# Patient Record
Sex: MALE | Race: WHITE | NOT HISPANIC OR LATINO | Employment: OTHER | URBAN - METROPOLITAN AREA
[De-identification: names, ages, dates, MRNs, and addresses within clinical notes are randomized per-mention and may not be internally consistent; named-entity substitution may affect disease eponyms.]

---

## 2016-12-16 LAB — HCV AB SER-ACNC: NEGATIVE

## 2023-02-14 LAB
LEFT EYE DIABETIC RETINOPATHY: NORMAL
RIGHT EYE DIABETIC RETINOPATHY: NORMAL
SEVERITY (EYE EXAM): NORMAL

## 2023-05-01 ENCOUNTER — OFFICE VISIT (OUTPATIENT)
Dept: FAMILY MEDICINE CLINIC | Facility: CLINIC | Age: 78
End: 2023-05-01

## 2023-05-01 VITALS
DIASTOLIC BLOOD PRESSURE: 82 MMHG | SYSTOLIC BLOOD PRESSURE: 146 MMHG | HEIGHT: 69 IN | TEMPERATURE: 97.3 F | WEIGHT: 169.8 LBS | BODY MASS INDEX: 25.15 KG/M2 | OXYGEN SATURATION: 98 % | HEART RATE: 65 BPM

## 2023-05-01 DIAGNOSIS — N18.31 TYPE 2 DIABETES MELLITUS WITH STAGE 3A CHRONIC KIDNEY DISEASE, WITHOUT LONG-TERM CURRENT USE OF INSULIN (HCC): ICD-10-CM

## 2023-05-01 DIAGNOSIS — E11.22 TYPE 2 DIABETES MELLITUS WITH STAGE 3A CHRONIC KIDNEY DISEASE, WITHOUT LONG-TERM CURRENT USE OF INSULIN (HCC): ICD-10-CM

## 2023-05-01 DIAGNOSIS — Z12.11 SCREENING FOR COLON CANCER: ICD-10-CM

## 2023-05-01 DIAGNOSIS — I10 ESSENTIAL HYPERTENSION: Primary | ICD-10-CM

## 2023-05-01 DIAGNOSIS — R39.12 BENIGN PROSTATIC HYPERPLASIA WITH WEAK URINARY STREAM: ICD-10-CM

## 2023-05-01 DIAGNOSIS — N40.1 BENIGN PROSTATIC HYPERPLASIA WITH WEAK URINARY STREAM: ICD-10-CM

## 2023-05-01 DIAGNOSIS — E78.2 MIXED HYPERLIPIDEMIA: ICD-10-CM

## 2023-05-01 DIAGNOSIS — N18.31 STAGE 3A CHRONIC KIDNEY DISEASE (HCC): ICD-10-CM

## 2023-05-01 PROBLEM — N40.0 BPH (BENIGN PROSTATIC HYPERPLASIA): Status: ACTIVE | Noted: 2021-11-14

## 2023-05-01 PROBLEM — N18.30 CKD (CHRONIC KIDNEY DISEASE), STAGE III (HCC): Status: ACTIVE | Noted: 2021-11-14

## 2023-05-01 RX ORDER — LISINOPRIL 10 MG/1
10 TABLET ORAL DAILY
Qty: 90 TABLET | Refills: 3 | Status: SHIPPED | OUTPATIENT
Start: 2023-05-01

## 2023-05-01 RX ORDER — GLIPIZIDE 5 MG/1
TABLET, FILM COATED, EXTENDED RELEASE ORAL
COMMUNITY
Start: 2023-03-19 | End: 2023-05-01 | Stop reason: SDUPTHER

## 2023-05-01 RX ORDER — HYDROCHLOROTHIAZIDE 25 MG/1
25 TABLET ORAL DAILY
Qty: 90 TABLET | Refills: 5 | Status: SHIPPED | OUTPATIENT
Start: 2023-05-01

## 2023-05-01 RX ORDER — LANCETS 30 GAUGE
EACH MISCELLANEOUS DAILY
COMMUNITY
Start: 2022-12-27

## 2023-05-01 RX ORDER — GLIPIZIDE 5 MG/1
5 TABLET, FILM COATED, EXTENDED RELEASE ORAL DAILY
Qty: 90 TABLET | Refills: 3 | Status: SHIPPED | OUTPATIENT
Start: 2023-05-01

## 2023-05-01 RX ORDER — TAMSULOSIN HYDROCHLORIDE 0.4 MG/1
CAPSULE ORAL
COMMUNITY
Start: 2023-04-07 | End: 2023-05-01 | Stop reason: SDUPTHER

## 2023-05-01 RX ORDER — TAMSULOSIN HYDROCHLORIDE 0.4 MG/1
0.4 CAPSULE ORAL
Qty: 90 CAPSULE | Refills: 3 | Status: SHIPPED | OUTPATIENT
Start: 2023-05-01

## 2023-05-01 RX ORDER — SIMVASTATIN 20 MG
TABLET ORAL
COMMUNITY
Start: 2023-04-23 | End: 2023-05-01 | Stop reason: SDUPTHER

## 2023-05-01 RX ORDER — SIMVASTATIN 20 MG
20 TABLET ORAL DAILY
Qty: 90 TABLET | Refills: 3 | Status: SHIPPED | OUTPATIENT
Start: 2023-05-01

## 2023-05-01 RX ORDER — CANAGLIFLOZIN 100 MG/1
TABLET, FILM COATED ORAL
COMMUNITY
Start: 2023-04-30 | End: 2023-05-01

## 2023-05-01 NOTE — PATIENT INSTRUCTIONS
History is reviewed  Blood pressure is very high  Keeping in mind his history of being admitted with high potassium of 5 8 in 11/21, will restart lisinopril 10 mg daily along with 25 mg of hydrochlorothiazide  Recheck potassium in about 3 weeks  He knows to avoid foods that are high in potassium content such as bananas and orange juice  Important to bring blood pressure down as his protein in the urine has elevated  Lipid profile excellent  Continue simvastatin  A1c 7 3  Increase Ozempic to 1 mg weekly and stop Invokana for now  Continue glipizide at 5 mg daily  Tamsulosin works well for urine stream   He was asked to obtain blood work in 3 weeks and follow-up for blood pressure in 6 weeks  Had COVID booster 2 weeks ago  Had Zostavax but not Shingrix  Shingrix is recommended and is 2 shots obtained in local drugstore  Will do Colbindu for colon cancer screening

## 2023-05-01 NOTE — PROGRESS NOTES
Chief Complaint   Patient presents with    [facility]        HPI   76-year-old male presents as a new patient  Past medical history significant for diabetes, hypertension, and chronic kidney disease  Remote history of a kidney stone which was made of uric acid  History negative for MI, stroke, and cancer  Diabetes present for about 20 years  Last A1c done 2 weeks ago 7 3  Excellent lipid profile  Medications are noted below  Has been on tamsulosin for obstructive symptoms and urine stream improved  No allergies to medications  Non-smoker  No alcohol  Notes that on Ozempic, has lost over 20 pounds  Past Medical History:   Diagnosis Date    CKD (chronic kidney disease) 2021    Diabetes type 2, controlled ([facility] [address] ) 2002    [facility]    did have this [facility]        History reviewed  No pertinent surgical history  Social History     Tobacco Use    Smoking status: Never    Smokeless tobacco: Never   Substance Use Topics    Alcohol use: Not Currently       Social History     Social History Narrative     since 1968  2 children  7 grandchildren  3 live nearby, 4 live in [place]  Retired  Was in [employer]  Then a   Wife also retired  Enjoys walking, visiting friends and grandkids  25-30 miles a week  The following portions of the patient's history were reviewed and updated as appropriate: allergies, current medications, past family history, past medical history, past social history, past surgical history and problem list       Review of Systems   Constitutional: Negative for activity change and appetite change  HENT: Negative for ear pain and hearing loss  Eyes: Negative for visual disturbance  Respiratory: Negative for shortness of breath and wheezing  Cardiovascular: Negative for chest pain and leg swelling  Gastrointestinal: Negative for abdominal pain, constipation and diarrhea     Genitourinary: Negative for "difficulty urinating  Musculoskeletal: Negative for arthralgias and back pain  Skin: Negative for rash  Neurological: Negative for headaches  Psychiatric/Behavioral: Negative for dysphoric mood  The patient is not nervous/anxious  /82 (BP Location: Left arm, Patient Position: Sitting, Cuff Size: Standard)   Pulse 65   Temp (!) 97 3 °F (36 3 °C) (Temporal)   Ht 5' 8 75\" (1 746 m) Comment: with shoes on  Wt 77 kg (169 lb 12 8 oz)   SpO2 98%   BMI 25 26 kg/m²      Physical Exam   Healthy-appearing male in no acute distress  Repeat blood pressure 180/80 in each arm  Lungs are clear  Heart regular with no murmur  Abdomen nontender  No edema  Mood is upbeat  Affect appropriate  Current Outpatient Medications:     ASPIRIN 81 PO, Take 1 tablet by mouth, Disp: , Rfl:     glucose blood test strip, Use 1 each daily as needed Use as instructed-One Touch Delica test strips, Disp: , Rfl:     OneTouch Delica Lancets 61F MISC, daily, Disp: , Rfl:     glipiZIDE (GLUCOTROL XL) 5 mg 24 hr tablet, Take 1 tablet (5 mg total) by mouth daily, Disp: 90 tablet, Rfl: 3    hydrochlorothiazide (HYDRODIURIL) 25 mg tablet, Take 1 tablet (25 mg total) by mouth daily, Disp: 90 tablet, Rfl: 5    lisinopril (ZESTRIL) 10 mg tablet, Take 1 tablet (10 mg total) by mouth daily, Disp: 90 tablet, Rfl: 3    semaglutide, 1 mg/dose, (Ozempic) 4 mg/3 mL injection pen, Inject 0 75 mL (1 mg total) under the skin once a week, Disp: 3 mL, Rfl: 5    simvastatin (ZOCOR) 20 mg tablet, Take 1 tablet (20 mg total) by mouth in the morning, Disp: 90 tablet, Rfl: 3    tamsulosin (FLOMAX) 0 4 mg, Take 1 capsule (0 4 mg total) by mouth daily with dinner, Disp: 90 capsule, Rfl: 3     No problem-specific Assessment & Plan notes found for this encounter  Diagnoses and all orders for this visit:    Essential hypertension  -     lisinopril (ZESTRIL) 10 mg tablet;  Take 1 tablet (10 mg total) by mouth daily  -     " hydrochlorothiazide (HYDRODIURIL) 25 mg tablet; Take 1 tablet (25 mg total) by mouth daily    Stage 3a chronic kidney disease (HCC)    Mixed hyperlipidemia  -     simvastatin (ZOCOR) 20 mg tablet; Take 1 tablet (20 mg total) by mouth in the morning    Type 2 diabetes mellitus with stage 3a chronic kidney disease, without long-term current use of insulin (HCC)  -     glipiZIDE (GLUCOTROL XL) 5 mg 24 hr tablet; Take 1 tablet (5 mg total) by mouth daily  -     semaglutide, 1 mg/dose, (Ozempic) 4 mg/3 mL injection pen; Inject 0 75 mL (1 mg total) under the skin once a week    Screening for colon cancer  -     Cologuard    Benign prostatic hyperplasia with weak urinary stream  -     tamsulosin (FLOMAX) 0 4 mg; Take 1 capsule (0 4 mg total) by mouth daily with dinner    Other orders  -     ASPIRIN 81 PO; Take 1 tablet by mouth  -     Discontinue: Invokana 100 MG  -     Discontinue: glipiZIDE (GLUCOTROL XL) 5 mg 24 hr tablet  -     Discontinue: semaglutide, 0 25 or 0 5 mg/dose, (Ozempic) 2 mg/1 5 mL injection pen; INJECT 0 5MG SUBCUTANEOUSLY ONCE WEEKLY  -     Discontinue: simvastatin (ZOCOR) 20 mg tablet  -     Discontinue: tamsulosin (FLOMAX) 0 4 mg  -     OneTouch Delica Lancets 45B MISC; daily  -     glucose blood test strip; Use 1 each daily as needed Use as instructed-One Touch Delica test strips        Patient Instructions   History is reviewed  Blood pressure is very high  Keeping in mind his history of being admitted with high potassium of 5 8 in 11/21, will restart lisinopril 10 mg daily along with 25 mg of hydrochlorothiazide  Recheck potassium in about 3 weeks  He knows to avoid foods that are high in potassium content such as bananas and orange juice  Important to bring blood pressure down as his protein in the urine has elevated  Lipid profile excellent  Continue simvastatin  A1c 7 3  Increase Ozempic to 1 mg weekly and stop Invokana for now  Continue glipizide at 5 mg daily    Tamsulosin works well for urine stream   He was asked to obtain blood work in 3 weeks and follow-up for blood pressure in 6 weeks  Had COVID booster 2 weeks ago  Had Zostavax but not Shingrix  Shingrix is recommended and is 2 shots obtained in local drugstore  Will do Cologuard for colon cancer screening

## 2023-05-23 ENCOUNTER — LAB (OUTPATIENT)
Dept: LAB | Facility: MEDICAL CENTER | Age: 78
End: 2023-05-23

## 2023-05-23 DIAGNOSIS — I10 ESSENTIAL HYPERTENSION: ICD-10-CM

## 2023-05-23 LAB
ANION GAP SERPL CALCULATED.3IONS-SCNC: 2 MMOL/L (ref 4–13)
BUN SERPL-MCNC: 24 MG/DL (ref 5–25)
CALCIUM SERPL-MCNC: 9.2 MG/DL (ref 8.3–10.1)
CHLORIDE SERPL-SCNC: 108 MMOL/L (ref 96–108)
CO2 SERPL-SCNC: 25 MMOL/L (ref 21–32)
CREAT SERPL-MCNC: 1.58 MG/DL (ref 0.6–1.3)
GFR SERPL CREATININE-BSD FRML MDRD: 41 ML/MIN/1.73SQ M
GLUCOSE P FAST SERPL-MCNC: 162 MG/DL (ref 65–99)
POTASSIUM SERPL-SCNC: 4.4 MMOL/L (ref 3.5–5.3)
SODIUM SERPL-SCNC: 135 MMOL/L (ref 135–147)

## 2023-05-27 LAB — COLOGUARD RESULT REPORTABLE: NEGATIVE

## 2023-06-12 ENCOUNTER — TELEPHONE (OUTPATIENT)
Dept: ADMINISTRATIVE | Facility: OTHER | Age: 78
End: 2023-06-12

## 2023-06-12 ENCOUNTER — OFFICE VISIT (OUTPATIENT)
Dept: FAMILY MEDICINE CLINIC | Facility: CLINIC | Age: 78
End: 2023-06-12
Payer: MEDICARE

## 2023-06-12 VITALS
OXYGEN SATURATION: 99 % | BODY MASS INDEX: 24.59 KG/M2 | DIASTOLIC BLOOD PRESSURE: 78 MMHG | HEIGHT: 69 IN | WEIGHT: 166 LBS | TEMPERATURE: 97.1 F | HEART RATE: 75 BPM | SYSTOLIC BLOOD PRESSURE: 136 MMHG

## 2023-06-12 DIAGNOSIS — E78.2 MIXED HYPERLIPIDEMIA: ICD-10-CM

## 2023-06-12 DIAGNOSIS — N18.31 TYPE 2 DIABETES MELLITUS WITH STAGE 3A CHRONIC KIDNEY DISEASE, WITHOUT LONG-TERM CURRENT USE OF INSULIN (HCC): ICD-10-CM

## 2023-06-12 DIAGNOSIS — E11.22 TYPE 2 DIABETES MELLITUS WITH STAGE 3A CHRONIC KIDNEY DISEASE, WITHOUT LONG-TERM CURRENT USE OF INSULIN (HCC): ICD-10-CM

## 2023-06-12 DIAGNOSIS — N18.31 STAGE 3A CHRONIC KIDNEY DISEASE (HCC): Primary | ICD-10-CM

## 2023-06-12 DIAGNOSIS — I10 ESSENTIAL HYPERTENSION: ICD-10-CM

## 2023-06-12 PROCEDURE — 99214 OFFICE O/P EST MOD 30 MIN: CPT | Performed by: FAMILY MEDICINE

## 2023-06-12 RX ORDER — LISINOPRIL 10 MG/1
5 TABLET ORAL DAILY
Qty: 90 TABLET | Refills: 3
Start: 2023-06-12

## 2023-06-12 NOTE — PROGRESS NOTES
"Chief Complaint   Patient presents with   • Follow-up     6 week f/u-foot exam due        HPI   Here for follow-up of hypertension, diabetes, and chronic kidney disease  At last visit, restarted on lisinopril 10 mg daily along with hydrochlorothiazide 25 mg daily for blood pressure  After about 1 week, he was getting lightheaded so he stopped it  Then he tried it again and lightheadedness resumed  He stopped it again  Blood pressure readings at home are in the 956 systolic  Ozempic was increased to 1 mg daily  Invokana was stopped  Continues on glipizide  No hypoglycemic episodes  Recent GFR was 41  Electrolytes okay  Cologuard was negative  Notes occasional numbness in his right hand, worse when he wipes his rear-end  Past Medical History:   Diagnosis Date   • CKD (chronic kidney disease) 2021   • Diabetes type 2, controlled ([facility] [address] ) 2002   • Hypertension 1984   • Kidney stone 1980    did have this removed        History reviewed  No pertinent surgical history  Social History     Tobacco Use   • Smoking status: Never   • Smokeless tobacco: Never   Substance Use Topics   • Alcohol use: Not Currently       Social History     Social History Narrative     since 1968  2 children  7 grandchildren  3 live nearby, 4 live in [address] [place] [address] [place]  Retired  Was in [employer]  Then a   Wife also retired  Enjoys walking, visiting friends and grandkids  25-30 miles a week           The following portions of the patient's history were reviewed and updated as appropriate: allergies, current medications, past family history, past medical history, past social history, past surgical history and problem list       Review of Systems       /78   Pulse 75   Temp (!) 97 1 °F (36 2 °C) (Temporal)   Ht 5' 8 75\" (1 746 m)   Wt 75 3 kg (166 lb)   SpO2 99%   BMI 24 69 kg/m²      Physical Exam  Cardiovascular:      Pulses: no weak pulses          Dorsalis pedis pulses are 1+ on the right side and " 1+ on the left side  Feet:      Right foot:      Skin integrity: No ulcer, skin breakdown, erythema, warmth, callus or dry skin  Left foot:      Skin integrity: No ulcer, skin breakdown, erythema, warmth, callus or dry skin  As well  Lungs are clear  Heart regular  Diabetic Foot Exam    Patient's shoes and socks removed  Right Foot/Ankle   Right Foot Inspection  Skin Exam: skin normal and skin intact  No dry skin, no warmth, no callus, no erythema, no maceration, no abnormal color, no pre-ulcer, no ulcer and no callus  Sensory   Monofilament testing: intact    Vascular  The right DP pulse is 1+  Left Foot/Ankle  Left Foot Inspection  Skin Exam: skin normal and skin intact  No dry skin, no warmth, no erythema, no maceration, normal color, no pre-ulcer, no ulcer and no callus  Sensory   Monofilament testing: intact    Vascular  The left DP pulse is 1+       Assign Risk Category  No deformity present  No loss of protective sensation  No weak pulses  Risk: 0              Current Outpatient Medications:   •  ASPIRIN 81 PO, Take 1 tablet by mouth, Disp: , Rfl:   •  glipiZIDE (GLUCOTROL XL) 5 mg 24 hr tablet, Take 1 tablet (5 mg total) by mouth daily, Disp: 90 tablet, Rfl: 3  •  glucose blood test strip, Use 1 each daily as needed Use as instructed-One Touch Delica test strips, Disp: , Rfl:   •  lisinopril (ZESTRIL) 10 mg tablet, Take 0 5 tablets (5 mg total) by mouth daily, Disp: 90 tablet, Rfl: 3  •  OneTouch Delica Lancets 13V MISC, daily, Disp: , Rfl:   •  semaglutide, 1 mg/dose, (Ozempic) 4 mg/3 mL injection pen, Inject 0 75 mL (1 mg total) under the skin once a week, Disp: 3 mL, Rfl: 5  •  simvastatin (ZOCOR) 20 mg tablet, Take 1 tablet (20 mg total) by mouth in the morning, Disp: 90 tablet, Rfl: 3  •  tamsulosin (FLOMAX) 0 4 mg, Take 1 capsule (0 4 mg total) by mouth daily with dinner, Disp: 90 capsule, Rfl: 3     No problem-specific Assessment & Plan notes found for this encounter  Diagnoses and all orders for this visit:    Stage 3a chronic kidney disease ([address] )    Essential hypertension  -     lisinopril (ZESTRIL) 10 mg tablet; Take 0 5 tablets (5 mg total) by mouth daily    Type 2 diabetes mellitus with stage 3a chronic kidney disease, without long-term current use of insulin ([facility] [address] )    Mixed hyperlipidemia        Patient Instructions   Blood pressure is significantly improved  Suggest using one half of the 10 mg lisinopril for kidney protection  Review of his kidney function which is basically stable  Last A1c was 7 3  Suspect numbness in the right hand has to do with a pinched nerve in the neck  Symptoms can come and go  Okay to use Tylenol for discomfort in the wrist   May take 2 extra strength Tylenol 3 times a day  Recheck in 4 months for follow-up of blood pressure and A1c

## 2023-06-12 NOTE — PATIENT INSTRUCTIONS
Blood pressure is significantly improved  Suggest using one half of the 10 mg lisinopril for kidney protection  Review of his kidney function which is basically stable  Last A1c was 7 3  Suspect numbness in the right hand has to do with a pinched nerve in the neck  Symptoms can come and go  Okay to use Tylenol for discomfort in the wrist   May take 2 extra strength Tylenol 3 times a day  Recheck in 4 months for follow-up of blood pressure and A1c

## 2023-06-12 NOTE — LETTER
Diabetic Eye Exam Form    Date Requested: 23  Patient: Polina Vega  Patient : 1945   Referring Provider: Houston Vigil MD      DIABETIC Eye Exam Date _______________________________      Type of Exam MUST be documented for Diabetic Eye Exams  Please CHECK ONE  Retinal Exam       Dilated Retinal Exam       OCT       Optomap-Iris Exam      Fundus Photography       Left Eye - Please check Retinopathy or No Retinopathy        Exam did show retinopathy    Exam did not show retinopathy       Right Eye - Please check Retinopathy or No Retinopathy       Exam did show retinopathy    Exam did not show retinopathy       Comments __________________________________________________________    Practice Providing Exam ______________________________________________    Exam Performed By (print name) _______________________________________      Provider Signature ___________________________________________________      These reports are needed for  compliance  Please fax this completed form and a copy of the Diabetic Eye Exam report to our office located at [place] [address] as soon as possible via Fax [phone] attention Tarri Deacon: Phone [phone]  We thank you for your assistance in treating our mutual patient

## 2023-06-12 NOTE — TELEPHONE ENCOUNTER
----- Message from Lavon Burkitt, MD sent at 6/12/2023 11:01 AM EDT -----  Please obtain results of recent eye exam from Dr Radha Martin

## 2023-06-12 NOTE — LETTER
Diabetic Eye Exam Form    Date Requested: 23  Patient: Ian Gant  Patient : 1945   Referring Provider: Silvia Shaikh MD      DIABETIC Eye Exam Date _______________________________      Type of Exam MUST be documented for Diabetic Eye Exams  Please CHECK ONE  Retinal Exam       Dilated Retinal Exam       OCT       Optomap-Iris Exam      Fundus Photography       Left Eye - Please check Retinopathy or No Retinopathy        Exam did show retinopathy    Exam did not show retinopathy       Right Eye - Please check Retinopathy or No Retinopathy       Exam did show retinopathy    Exam did not show retinopathy       Comments __________________________________________________________    Practice Providing Exam ______________________________________________    Exam Performed By (print name) _______________________________________      Provider Signature ___________________________________________________      These reports are needed for  compliance  Please fax this completed form and a copy of the Diabetic Eye Exam report to our office located at [place] [address] as soon as possible via Fax [phone] virgilio Martin: Phone [phone]  We thank you for your assistance in treating our mutual patient

## 2023-06-12 NOTE — TELEPHONE ENCOUNTER
Upon review of the In Basket request and the patient's chart, initial outreach has been made via fax to facility  Please see Contacts section for details        x1 eye    Thank you  Vivian Aguayo

## 2023-06-14 ENCOUNTER — TELEPHONE (OUTPATIENT)
Dept: FAMILY MEDICINE CLINIC | Facility: CLINIC | Age: 78
End: 2023-06-14

## 2023-06-14 NOTE — TELEPHONE ENCOUNTER
Patients wife called she states that patient took his blood pressure today and it was 107/55 and she thinks this is too low and she is asking if he could stop taking the lisinopril he takes 5 mg of it daily please advise

## 2023-06-16 NOTE — TELEPHONE ENCOUNTER
Upon review of the In Basket request we were able to locate, review, and update the patient chart as requested for Diabetic Eye Exam     Any additional questions or concerns should be emailed to the Practice Liaisons via the appropriate education email address, please do not reply via In Basket      Thank you  Rachael Shelton

## 2023-07-14 ENCOUNTER — OFFICE VISIT (OUTPATIENT)
Dept: FAMILY MEDICINE CLINIC | Facility: CLINIC | Age: 78
End: 2023-07-14
Payer: MEDICARE

## 2023-07-14 VITALS
WEIGHT: 162.3 LBS | DIASTOLIC BLOOD PRESSURE: 74 MMHG | HEIGHT: 69 IN | OXYGEN SATURATION: 99 % | SYSTOLIC BLOOD PRESSURE: 126 MMHG | BODY MASS INDEX: 24.04 KG/M2 | HEART RATE: 66 BPM | TEMPERATURE: 97.5 F

## 2023-07-14 DIAGNOSIS — N18.31 STAGE 3A CHRONIC KIDNEY DISEASE (HCC): ICD-10-CM

## 2023-07-14 DIAGNOSIS — E11.22 TYPE 2 DIABETES MELLITUS WITH STAGE 3A CHRONIC KIDNEY DISEASE, WITHOUT LONG-TERM CURRENT USE OF INSULIN (HCC): Primary | ICD-10-CM

## 2023-07-14 DIAGNOSIS — Z01.818 PRE-OP EXAMINATION: ICD-10-CM

## 2023-07-14 DIAGNOSIS — G56.03 BILATERAL CARPAL TUNNEL SYNDROME: ICD-10-CM

## 2023-07-14 DIAGNOSIS — N18.31 TYPE 2 DIABETES MELLITUS WITH STAGE 3A CHRONIC KIDNEY DISEASE, WITHOUT LONG-TERM CURRENT USE OF INSULIN (HCC): Primary | ICD-10-CM

## 2023-07-14 DIAGNOSIS — M35.3 PMR (POLYMYALGIA RHEUMATICA) (HCC): ICD-10-CM

## 2023-07-14 PROBLEM — R76.8 RHEUMATOID FACTOR POSITIVE: Status: ACTIVE | Noted: 2023-07-14

## 2023-07-14 PROBLEM — M06.9 RHEUMATOID ARTHRITIS INVOLVING MULTIPLE SITES (HCC): Status: RESOLVED | Noted: 2023-07-14 | Resolved: 2023-07-14

## 2023-07-14 PROBLEM — M06.9 RHEUMATOID ARTHRITIS INVOLVING MULTIPLE SITES (HCC): Status: ACTIVE | Noted: 2023-07-14

## 2023-07-14 LAB — SL AMB POCT HEMOGLOBIN AIC: 8.2 (ref ?–6.5)

## 2023-07-14 PROCEDURE — 99214 OFFICE O/P EST MOD 30 MIN: CPT | Performed by: FAMILY MEDICINE

## 2023-07-14 PROCEDURE — 83036 HEMOGLOBIN GLYCOSYLATED A1C: CPT | Performed by: FAMILY MEDICINE

## 2023-07-14 RX ORDER — PREDNISONE 10 MG/1
TABLET ORAL
COMMUNITY
Start: 2023-06-28

## 2023-07-14 NOTE — PATIENT INSTRUCTIONS
Patient is an acceptable candidate for anticipated carpal tunnel release. Discussion of his diabetes. A1c 8.2. Begin Jardiance 10 mg daily for both blood sugar and renal protection. Discussion of his rheumatologic condition as he has a positive rheumatoid factor, positive DAR, and elevated inflammatory markers and symptoms consistent with PMR. Suggest treating PMR since that seems to have responded nicely to prednisone. If he has relief with 5 mg, that is adequate. If he needs 10 mg or 5 alternating with 10, that would be okay. Will balance his diabetic control with medication. Recheck in 1 month.

## 2023-07-14 NOTE — PROGRESS NOTES
Chief Complaint   Patient presents with   • Blood Sugar Problem   • Pre-op Exam        HPI   Here for multiple reasons. Needs preop clearance as he has been diagnosed with carpal tunnel on the right hand and anticipates carpal tunnel release by Dr. Richard Riley. He also had blood work done and was sent to rheumatology. Diagnosed with both rheumatoid arthritis and PMR. Was given 10 mg of prednisone to use which raised his blood sugar and made him reluctant. Prior to that, was getting lightheaded with lisinopril 5 mg so he stopped that. Blood pressure readings are okay but he does have urine protein and GFR was only 44. He does note improvement in muscle aches when he took 10 mg of prednisone. He was having aches in his shoulders. Some difficulty getting up out of a chair. Sed rate was 67. CRP elevated at 16. Rheumatoid factor positive at 40. Current dose of prednisone is 5 mg because of elevated blood sugars. Currently on Ozempic 1 mg and 5 mg of glipizide. A1c 8.2. Past Medical History:   Diagnosis Date   • CKD (chronic kidney disease) 2021   • Diabetes type 2, controlled ([facility]) 2002   • Hypertension 1984   • Kidney stone 1980    did have this removed   • PMR (polymyalgia rheumatica) ([facility]) 7/14/2023    Diagnosed 6/23 with elevated sed rate and CRP   • Rheumatoid arthritis involving multiple sites ([facility]) 7/14/2023    Positive rheumatoid factor 40   • Rheumatoid factor positive 7/14/2023        History reviewed. No pertinent surgical history. Social History     Tobacco Use   • Smoking status: Never   • Smokeless tobacco: Never   Substance Use Topics   • Alcohol use: Not Currently       Social History     Social History Narrative     since 1968. 2 children. 7 grandchildren. 3 live nearby, 4 live in [place]. 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 Enjoys walking, visiting friends and grandkids. 25-30 miles a week.          The following portions of the patient's history were reviewed and updated as appropriate: allergies, current medications, past family history, past medical history, past social history, past surgical history and problem list.      Review of Systems       /74 (BP Location: Left arm, Patient Position: Sitting, Cuff Size: Adult)   Pulse 66   Temp 97.5 °F (36.4 °C) (Temporal)   Ht 5' 8.75" (1.746 m)   Wt 73.6 kg (162 lb 4.8 oz)   SpO2 99%   BMI 24.14 kg/m²      Physical Exam   Appears comfortable. No neck nodes. Lungs are clear. Heart regular. Degenerative changes noted of the joints of the fingers. Abnormal blood work reviewed including sed rate of 67, CRP of 16, positive rheumatoid factor of 40, and positive DAR. Current Outpatient Medications:   •  Empagliflozin (JARDIANCE) 10 MG TABS tablet, Take 1 tablet (10 mg total) by mouth every morning, Disp: 30 tablet, Rfl: 5  •  glipiZIDE (GLUCOTROL XL) 5 mg 24 hr tablet, Take 1 tablet (5 mg total) by mouth daily, Disp: 90 tablet, Rfl: 3  •  glucose blood test strip, Use 1 each daily as needed Use as instructed-One Touch Delica test strips, Disp: , Rfl:   •  OneTouch Delica Lancets 10G MISC, daily, Disp: , Rfl:   •  predniSONE 10 mg tablet, Pt taking half, Disp: , Rfl:   •  semaglutide, 1 mg/dose, (Ozempic) 4 mg/3 mL injection pen, Inject 0.75 mL (1 mg total) under the skin once a week, Disp: 3 mL, Rfl: 5  •  simvastatin (ZOCOR) 20 mg tablet, Take 1 tablet (20 mg total) by mouth in the morning, Disp: 90 tablet, Rfl: 3  •  tamsulosin (FLOMAX) 0.4 mg, Take 1 capsule (0.4 mg total) by mouth daily with dinner, Disp: 90 capsule, Rfl: 3  •  ASPIRIN 81 PO, Take 1 tablet by mouth (Patient not taking: Reported on 7/14/2023), Disp: , Rfl:      No problem-specific Assessment & Plan notes found for this encounter.        Diagnoses and all orders for this visit:    Type 2 diabetes mellitus with stage 3a chronic kidney disease, without long-term current use of insulin (HCC)  -     POCT hemoglobin A1c  -     Empagliflozin (JARDIANCE) 10 MG TABS tablet; Take 1 tablet (10 mg total) by mouth every morning    Pre-op examination    Bilateral carpal tunnel syndrome    PMR (polymyalgia rheumatica) ([facility])    Stage 3a chronic kidney disease ([address])    Other orders  -     predniSONE 10 mg tablet; Pt taking half        Patient Instructions   Patient is an acceptable candidate for anticipated carpal tunnel release. Discussion of his diabetes. A1c 8.2. Begin Jardiance 10 mg daily for both blood sugar and renal protection. Discussion of his rheumatologic condition as he has a positive rheumatoid factor, positive DAR, and elevated inflammatory markers and symptoms consistent with PMR. Suggest treating PMR since that seems to have responded nicely to prednisone. If he has relief with 5 mg, that is adequate. If he needs 10 mg or 5 alternating with 10, that would be okay. Will balance his diabetic control with medication. Recheck in 1 month.

## 2023-07-20 DIAGNOSIS — E11.22 TYPE 2 DIABETES MELLITUS WITH STAGE 3A CHRONIC KIDNEY DISEASE, WITHOUT LONG-TERM CURRENT USE OF INSULIN (HCC): Primary | ICD-10-CM

## 2023-07-20 DIAGNOSIS — N18.31 TYPE 2 DIABETES MELLITUS WITH STAGE 3A CHRONIC KIDNEY DISEASE, WITHOUT LONG-TERM CURRENT USE OF INSULIN (HCC): Primary | ICD-10-CM

## 2023-07-21 DIAGNOSIS — E11.22 TYPE 2 DIABETES MELLITUS WITH STAGE 3A CHRONIC KIDNEY DISEASE, WITHOUT LONG-TERM CURRENT USE OF INSULIN (HCC): ICD-10-CM

## 2023-07-21 DIAGNOSIS — N18.31 TYPE 2 DIABETES MELLITUS WITH STAGE 3A CHRONIC KIDNEY DISEASE, WITHOUT LONG-TERM CURRENT USE OF INSULIN (HCC): ICD-10-CM

## 2023-07-21 RX ORDER — LANCETS 30 GAUGE
1 EACH MISCELLANEOUS 4 TIMES DAILY
Qty: 200 EACH | Refills: 5 | OUTPATIENT
Start: 2023-07-21

## 2023-07-21 RX ORDER — LANCETS 30 GAUGE
1 EACH MISCELLANEOUS 4 TIMES DAILY
Qty: 200 EACH | Refills: 5 | Status: SHIPPED | OUTPATIENT
Start: 2023-07-21

## 2023-07-21 NOTE — TELEPHONE ENCOUNTER
Patient called stating he needs the script to be for OneTouch Ultra instead and is he supposed to do it four times a day now instead of once a day

## 2023-07-25 DIAGNOSIS — N18.31 TYPE 2 DIABETES MELLITUS WITH STAGE 3A CHRONIC KIDNEY DISEASE, WITHOUT LONG-TERM CURRENT USE OF INSULIN (HCC): ICD-10-CM

## 2023-07-25 DIAGNOSIS — E11.22 TYPE 2 DIABETES MELLITUS WITH STAGE 3A CHRONIC KIDNEY DISEASE, WITHOUT LONG-TERM CURRENT USE OF INSULIN (HCC): ICD-10-CM

## 2023-07-28 ENCOUNTER — TELEPHONE (OUTPATIENT)
Dept: FAMILY MEDICINE CLINIC | Facility: CLINIC | Age: 78
End: 2023-07-28

## 2023-07-28 DIAGNOSIS — N18.31 TYPE 2 DIABETES MELLITUS WITH STAGE 3A CHRONIC KIDNEY DISEASE, WITHOUT LONG-TERM CURRENT USE OF INSULIN (HCC): ICD-10-CM

## 2023-07-28 DIAGNOSIS — E11.22 TYPE 2 DIABETES MELLITUS WITH STAGE 3A CHRONIC KIDNEY DISEASE, WITHOUT LONG-TERM CURRENT USE OF INSULIN (HCC): ICD-10-CM

## 2023-07-28 NOTE — TELEPHONE ENCOUNTER
[employer] send a message for the one touch ultra strips, they state that this has been rejected by the insurance. They said that the insurance plan benefit limits on quantity and day supply is in place. This means that the patient might be trying to refill too early or the provider wrote a script for a longer period than the insurance plan provides. If patient is not on insulin patient can not test more than one time daily. Please fix the instructions for the testing supply in order to have patient  his strips.

## 2023-08-15 ENCOUNTER — RA CDI HCC (OUTPATIENT)
Dept: OTHER | Facility: HOSPITAL | Age: 78
End: 2023-08-15

## 2023-08-15 NOTE — PROGRESS NOTES
E11.65   [address] [facility] coding opportunities          Chart Reviewed number of suggestions sent to Provider: 1     Patients Insurance     Medicare Insurance: [employer]

## 2023-08-21 ENCOUNTER — OFFICE VISIT (OUTPATIENT)
Dept: FAMILY MEDICINE CLINIC | Facility: CLINIC | Age: 78
End: 2023-08-21
Payer: MEDICARE

## 2023-08-21 VITALS
SYSTOLIC BLOOD PRESSURE: 144 MMHG | HEIGHT: 69 IN | WEIGHT: 160 LBS | BODY MASS INDEX: 23.7 KG/M2 | TEMPERATURE: 97.9 F | OXYGEN SATURATION: 100 % | DIASTOLIC BLOOD PRESSURE: 66 MMHG | HEART RATE: 65 BPM

## 2023-08-21 DIAGNOSIS — Z00.00 MEDICARE ANNUAL WELLNESS VISIT, SUBSEQUENT: Primary | ICD-10-CM

## 2023-08-21 DIAGNOSIS — N18.31 TYPE 2 DIABETES MELLITUS WITH STAGE 3A CHRONIC KIDNEY DISEASE, WITHOUT LONG-TERM CURRENT USE OF INSULIN (HCC): ICD-10-CM

## 2023-08-21 DIAGNOSIS — N18.31 STAGE 3A CHRONIC KIDNEY DISEASE (HCC): ICD-10-CM

## 2023-08-21 DIAGNOSIS — M35.3 PMR (POLYMYALGIA RHEUMATICA) (HCC): ICD-10-CM

## 2023-08-21 DIAGNOSIS — E11.22 TYPE 2 DIABETES MELLITUS WITH STAGE 3A CHRONIC KIDNEY DISEASE, WITHOUT LONG-TERM CURRENT USE OF INSULIN (HCC): ICD-10-CM

## 2023-08-21 PROCEDURE — 99214 OFFICE O/P EST MOD 30 MIN: CPT | Performed by: FAMILY MEDICINE

## 2023-08-21 PROCEDURE — G0438 PPPS, INITIAL VISIT: HCPCS | Performed by: FAMILY MEDICINE

## 2023-08-21 RX ORDER — GLIPIZIDE 10 MG/1
TABLET, FILM COATED, EXTENDED RELEASE ORAL
Qty: 180 TABLET | Refills: 3 | Status: SHIPPED | OUTPATIENT
Start: 2023-08-21

## 2023-08-21 NOTE — PROGRESS NOTES
Chief Complaint   Patient presents with   • Medicare Wellness Visit   • Follow-up        HPI   Here for Medicare wellness exam and follow-up of diabetes, stage III chronic kidney disease, and polymyalgia rheumatica. At last visit, started on Jardiance 10 mg daily as his A1c was 8.2. Prednisone decreased to 5 mg daily. Taking 2 extra strength Tylenol twice a day. Notes some stiffness in his shoulders when he wakes up in the morning. No discomfort in his legs. Has been checking sugars 4 times a day. Average sugars at lunch is 294.  266 at dinner and 192 at bedtime. 140 1 in the morning. Continues on 1 mg of Ozempic weekly. Seeing rheumatology in a couple days. Recent sed rate down to 38. Had been 79. CRP has been 16.1. Down to 9. Past Medical History:   Diagnosis Date   • CKD (chronic kidney disease) 2021   • Diabetes type 2, controlled ([facility]) 2002   • Hypertension 1984   • Kidney stone 1980    did have this removed   • PMR (polymyalgia rheumatica) ([facility] [address] [facility]) 7/14/2023    Diagnosed 6/23 with elevated sed rate and CRP   • Rheumatoid arthritis involving multiple sites ([facility]) 7/14/2023    Positive rheumatoid factor 40   • Rheumatoid factor positive 7/14/2023        No past surgical history on file. Social History     Tobacco Use   • Smoking status: Never   • Smokeless tobacco: Never   Substance Use Topics   • Alcohol use: Not Currently       Social History     Social History Narrative     since 1968. 2 children. 7 grandchildren. 3 live nearby, 4 live in [place]. 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 Enjoys walking, visiting friends and grandkids. 25-30 miles a week.          The following portions of the patient's history were reviewed and updated as appropriate: allergies, current medications, past family history, past medical history, past social history, past surgical history and problem list.      Review of Systems       /66 (BP Location: Left arm, Patient Position: Sitting, Cuff Size: Standard)   Pulse 65   Temp 97.9 °F (36.6 °C) (Temporal)   Ht 5' 8.75" (1.746 m)   Wt 72.6 kg (160 lb)   SpO2 100%   BMI 23.80 kg/m²      Physical Exam   Appears well. Log of sugar reviewed. Current Outpatient Medications:   •  Empagliflozin (JARDIANCE) 10 MG TABS tablet, Take 1 tablet (10 mg total) by mouth every morning, Disp: 30 tablet, Rfl: 5  •  glipiZIDE (GLUCOTROL XL) 5 mg 24 hr tablet, Take 1 tablet (5 mg total) by mouth daily, Disp: 90 tablet, Rfl: 3  •  glucose blood test strip, Test dasily, Disp: 100 strip, Rfl: 5  •  OneTouch Delica Lancets 13Y MISC, Use 1 Lancet 4 (four) times a day, Disp: 200 each, Rfl: 5  •  predniSONE 10 mg tablet, Pt taking half, Disp: , Rfl:   •  semaglutide, 1 mg/dose, (Ozempic) 4 mg/3 mL injection pen, Inject 0.75 mL (1 mg total) under the skin once a week, Disp: 3 mL, Rfl: 5  •  simvastatin (ZOCOR) 20 mg tablet, Take 1 tablet (20 mg total) by mouth in the morning, Disp: 90 tablet, Rfl: 3  •  tamsulosin (FLOMAX) 0.4 mg, Take 1 capsule (0.4 mg total) by mouth daily with dinner, Disp: 90 capsule, Rfl: 3     No problem-specific Assessment & Plan notes found for this encounter. Diagnoses and all orders for this visit:    Medicare annual wellness visit, subsequent    Type 2 diabetes mellitus with stage 3a chronic kidney disease, without long-term current use of insulin (HCC)  -     glipiZIDE (GLUCOTROL XL) 10 mg 24 hr tablet; 2 tablets daily in the morning  -     Empagliflozin 25 MG TABS; Take 1 tablet (25 mg total) by mouth daily    PMR (polymyalgia rheumatica) (HCC)    Stage 3a chronic kidney disease [facility])        Patient Instructions       Medicare Preventive Visit Patient Instructions  Thank you for completing your Welcome to Medicare Visit or Medicare Annual Wellness Visit today. Your next wellness visit will be due in one year (8/21/2024).   The screening/preventive services that you may require over the next 5-10 years are detailed below. Some tests may not apply to you based off risk factors and/or age. Screening tests ordered at today's visit but not completed yet may show as past due. Also, please note that scanned in results may not display below. Preventive Screenings:  Service Recommendations Previous Testing/Comments   Colorectal Cancer Screening  · Colonoscopy    · Fecal Occult Blood Test (FOBT)/Fecal Immunochemical Test (FIT)  · Fecal DNA/Cologuard Test  · Flexible Sigmoidoscopy Age: 43-73 years old   Colonoscopy: every 10 years (May be performed more frequently if at higher risk)  OR  FOBT/FIT: every 1 year  OR  Cologuard: every 3 years  OR  Sigmoidoscopy: every 5 years  Screening may be recommended earlier than age 39 if at higher risk for colorectal cancer. Also, an individualized decision between you and your healthcare provider will decide whether screening between the ages of 77-80 would be appropriate. Colonoscopy: 05/20/2023  FOBT/FIT: Not on file  Cologuard: 05/20/2023  Sigmoidoscopy: Not on file          Prostate Cancer Screening Individualized decision between patient and health care provider in men between ages of 53-66   Medicare will cover every 12 months beginning on the day after your 50th birthday PSA: No results in last 5 years           Hepatitis C Screening Once for adults born between 1945 and 1965  More frequently in patients at high risk for Hepatitis C Hep C Antibody: Not on file        Diabetes Screening 1-2 times per year if you're at risk for diabetes or have pre-diabetes Fasting glucose: 162 mg/dL (5/23/2023)  A1C: 8.2 (7/14/2023)      Cholesterol Screening Once every 5 years if you don't have a lipid disorder. May order more often based on risk factors. Lipid panel: 04/18/2023         Other Preventive Screenings Covered by Medicare:  1. Abdominal Aortic Aneurysm (AAA) Screening: covered once if your at risk.  You're considered to be at risk if you have a family history of AAA or a male between the age of 70-76 who smoking at least 100 cigarettes in your lifetime. 2. Lung Cancer Screening: covers low dose CT scan once per year if you meet all of the following conditions: (1) Age 48-67; (2) No signs or symptoms of lung cancer; (3) Current smoker or have quit smoking within the last 15 years; (4) You have a tobacco smoking history of at least 20 pack years (packs per day x number of years you smoked); (5) You get a written order from a healthcare provider. 3. Glaucoma Screening: covered annually if you're considered high risk: (1) You have diabetes OR (2) Family history of glaucoma OR (3)  aged 48 and older OR (3)  American aged 72 and older  3. Osteoporosis Screening: covered every 2 years if you meet one of the following conditions: (1) Have a vertebral abnormality; (2) On glucocorticoid therapy for more than 3 months; (3) Have primary hyperparathyroidism; (4) On osteoporosis medications and need to assess response to drug therapy. 5. HIV Screening: covered annually if you're between the age of 14-79. Also covered annually if you are younger than 13 and older than 72 with risk factors for HIV infection. For pregnant patients, it is covered up to 3 times per pregnancy.     Immunizations:  Immunization Recommendations   Influenza Vaccine Annual influenza vaccination during flu season is recommended for all persons aged >= 6 months who do not have contraindications   Pneumococcal Vaccine   * Pneumococcal conjugate vaccine = PCV13 (Prevnar 13), PCV15 (Vaxneuvance), PCV20 (Prevnar 20)  * Pneumococcal polysaccharide vaccine = PPSV23 (Pneumovax) Adults 20-63 years old: 1-3 doses may be recommended based on certain risk factors  Adults 72 years old: 1-2 doses may be recommended based off what pneumonia vaccine you previously received   Hepatitis B Vaccine 3 dose series if at intermediate or high risk (ex: diabetes, end stage renal disease, liver disease)   Tetanus (Td) Vaccine - COST NOT COVERED BY MEDICARE PART B Following completion of primary series, a booster dose should be given every 10 years to maintain immunity against tetanus. Td may also be given as tetanus wound prophylaxis. Tdap Vaccine - COST NOT COVERED BY MEDICARE PART B Recommended at least once for all adults. For pregnant patients, recommended with each pregnancy. Shingles Vaccine (Shingrix) - COST NOT COVERED BY MEDICARE PART B  2 shot series recommended in those aged 48 and above     Health Maintenance Due:      Topic Date Due   • Hepatitis C Screening  Never done   • Colorectal Cancer Screening  Discontinued     Immunizations Due:      Topic Date Due   • COVID-19 Vaccine (6 - Moderna series) 08/17/2023   • Influenza Vaccine (1) 09/01/2023     Advance Directives   What are advance directives? Advance directives are legal documents that state your wishes and plans for medical care. These plans are made ahead of time in case you lose your ability to make decisions for yourself. Advance directives can apply to any medical decision, such as the treatments you want, and if you want to donate organs. What are the types of advance directives? There are many types of advance directives, and each state has rules about how to use them. You may choose a combination of any of the following:  · Living will: This is a written record of the treatment you want. You can also choose which treatments you do not want, which to limit, and which to stop at a certain time. This includes surgery, medicine, IV fluid, and tube feedings. · Durable power of  for healthcare [facility]): This is a written record that states who you want to make healthcare choices for you when you are unable to make them for yourself. This person, called a proxy, is usually a family member or a friend. You may choose more than 1 proxy. · Do not resuscitate (DNR) order:  A DNR order is used in case your heart stops beating or you stop breathing.  It is a request not to have certain forms of treatment, such as CPR. A DNR order may be included in other types of advance directives. · Medical directive: This covers the care that you want if you are in a coma, near death, or unable to make decisions for yourself. You can list the treatments you want for each condition. Treatment may include pain medicine, surgery, blood transfusions, dialysis, IV or tube feedings, and a ventilator (breathing machine). · Values history: This document has questions about your views, beliefs, and how you feel and think about life. This information can help others choose the care that you would choose. Why are advance directives important? An advance directive helps you control your care. Although spoken wishes may be used, it is better to have your wishes written down. Spoken wishes can be misunderstood, or not followed. Treatments may be given even if you do not want them. An advance directive may make it easier for your family to make difficult choices about your care. © Copyright [employer] 2018 Information is for End User's use only and may not be sold, redistributed or otherwise used for commercial purposes.  All illustrations and images included in CareNotes® are the copyrighted property of [employer] or  [address]

## 2023-08-21 NOTE — PATIENT INSTRUCTIONS
Medicare wellness exam is completed. PMR is almost controlled on 5 mg of prednisone. However, sugars are out of control. Increase Jardiance to 25 mg daily. Continue Ozempic. Increase Glucotrol XL to 10 mg daily. If sugars are still high over the next 3 weeks, can increase Glucotrol to 20 mg daily. Will check an A1c in 2 months. He was offered a Dexcom prescription if he prefers to wear a device instead of checking sugar on his fingers 4 times a day. Recheck in 2 months. Medicare Preventive Visit Patient Instructions  Thank you for completing your Welcome to Medicare Visit or Medicare Annual Wellness Visit today. Your next wellness visit will be due in one year (8/21/2024). The screening/preventive services that you may require over the next 5-10 years are detailed below. Some tests may not apply to you based off risk factors and/or age. Screening tests ordered at today's visit but not completed yet may show as past due. Also, please note that scanned in results may not display below. Preventive Screenings:  Service Recommendations Previous Testing/Comments   Colorectal Cancer Screening  Colonoscopy    Fecal Occult Blood Test (FOBT)/Fecal Immunochemical Test (FIT)  Fecal DNA/Cologuard Test  Flexible Sigmoidoscopy Age: 43-73 years old   Colonoscopy: every 10 years (May be performed more frequently if at higher risk)  OR  FOBT/FIT: every 1 year  OR  Cologuard: every 3 years  OR  Sigmoidoscopy: every 5 years  Screening may be recommended earlier than age 39 if at higher risk for colorectal cancer. Also, an individualized decision between you and your healthcare provider will decide whether screening between the ages of 77-80 would be appropriate.  Colonoscopy: 05/20/2023  FOBT/FIT: Not on file  Cologuard: 05/20/2023  Sigmoidoscopy: Not on file          Prostate Cancer Screening Individualized decision between patient and health care provider in men between ages of 53-66   Medicare will cover every 12 months beginning on the day after your 50th birthday PSA: No results in last 5 years           Hepatitis C Screening Once for adults born between 1945 and 1965  More frequently in patients at high risk for Hepatitis C Hep C Antibody: Not on file        Diabetes Screening 1-2 times per year if you're at risk for diabetes or have pre-diabetes Fasting glucose: 162 mg/dL (5/23/2023)  A1C: 8.2 (7/14/2023)      Cholesterol Screening Once every 5 years if you don't have a lipid disorder. May order more often based on risk factors. Lipid panel: 04/18/2023         Other Preventive Screenings Covered by Medicare:  Abdominal Aortic Aneurysm (AAA) Screening: covered once if your at risk. You're considered to be at risk if you have a family history of AAA or a male between the age of 70-76 who smoking at least 100 cigarettes in your lifetime. Lung Cancer Screening: covers low dose CT scan once per year if you meet all of the following conditions: (1) Age 48-67; (2) No signs or symptoms of lung cancer; (3) Current smoker or have quit smoking within the last 15 years; (4) You have a tobacco smoking history of at least 20 pack years (packs per day x number of years you smoked); (5) You get a written order from a healthcare provider. Glaucoma Screening: covered annually if you're considered high risk: (1) You have diabetes OR (2) Family history of glaucoma OR (3)  aged 48 and older OR (3)  American aged 72 and older  Osteoporosis Screening: covered every 2 years if you meet one of the following conditions: (1) Have a vertebral abnormality; (2) On glucocorticoid therapy for more than 3 months; (3) Have primary hyperparathyroidism; (4) On osteoporosis medications and need to assess response to drug therapy. HIV Screening: covered annually if you're between the age of 14-79. Also covered annually if you are younger than 13 and older than 72 with risk factors for HIV infection.  For pregnant patients, it is covered up to 3 times per pregnancy. Immunizations:  Immunization Recommendations   Influenza Vaccine Annual influenza vaccination during flu season is recommended for all persons aged >= 6 months who do not have contraindications   Pneumococcal Vaccine   * Pneumococcal conjugate vaccine = PCV13 (Prevnar 13), PCV15 (Vaxneuvance), PCV20 (Prevnar 20)  * Pneumococcal polysaccharide vaccine = PPSV23 (Pneumovax) Adults 20-63 years old: 1-3 doses may be recommended based on certain risk factors  Adults 72 years old: 1-2 doses may be recommended based off what pneumonia vaccine you previously received   Hepatitis B Vaccine 3 dose series if at intermediate or high risk (ex: diabetes, end stage renal disease, liver disease)   Tetanus (Td) Vaccine - COST NOT COVERED BY MEDICARE PART B Following completion of primary series, a booster dose should be given every 10 years to maintain immunity against tetanus. Td may also be given as tetanus wound prophylaxis. Tdap Vaccine - COST NOT COVERED BY MEDICARE PART B Recommended at least once for all adults. For pregnant patients, recommended with each pregnancy. Shingles Vaccine (Shingrix) - COST NOT COVERED BY MEDICARE PART B  2 shot series recommended in those aged 48 and above     Health Maintenance Due:      Topic Date Due    Hepatitis C Screening  Never done    Colorectal Cancer Screening  Discontinued     Immunizations Due:      Topic Date Due    COVID-19 Vaccine (6 - Moderna series) 08/17/2023    Influenza Vaccine (1) 09/01/2023     Advance Directives   What are advance directives? Advance directives are legal documents that state your wishes and plans for medical care. These plans are made ahead of time in case you lose your ability to make decisions for yourself. Advance directives can apply to any medical decision, such as the treatments you want, and if you want to donate organs. What are the types of advance directives?   There are many types of advance directives, and each state has rules about how to use them. You may choose a combination of any of the following:  Living will: This is a written record of the treatment you want. You can also choose which treatments you do not want, which to limit, and which to stop at a certain time. This includes surgery, medicine, IV fluid, and tube feedings. Durable power of  for [facility]): This is a written record that states who you want to make healthcare choices for you when you are unable to make them for yourself. This person, called a proxy, is usually a family member or a friend. You may choose more than 1 proxy. Do not resuscitate (DNR) order:  A DNR order is used in case your heart stops beating or you stop breathing. It is a request not to have certain forms of treatment, such as CPR. A DNR order may be included in other types of advance directives. Medical directive: This covers the care that you want if you are in a coma, near death, or unable to make decisions for yourself. You can list the treatments you want for each condition. Treatment may include pain medicine, surgery, blood transfusions, dialysis, IV or tube feedings, and a ventilator (breathing machine). Values history: This document has questions about your views, beliefs, and how you feel and think about life. This information can help others choose the care that you would choose. Why are advance directives important? An advance directive helps you control your care. Although spoken wishes may be used, it is better to have your wishes written down. Spoken wishes can be misunderstood, or not followed. Treatments may be given even if you do not want them. An advance directive may make it easier for your family to make difficult choices about your care. © Copyright [employer] 2018 Information is for End User's use only and may not be sold, redistributed or otherwise used for commercial purposes.  All illustrations and images included in CareNotes® are the copyrighted property of [employer]. or [address] [place] [address]

## 2023-08-21 NOTE — PROGRESS NOTES
Assessment and Plan:     Problem List Items Addressed This Visit    None  Visit Diagnoses     Medicare annual wellness visit, subsequent    -  Primary           Preventive health issues were discussed with patient, and age appropriate screening tests were ordered as noted in patient's After Visit Summary. Personalized health advice and appropriate referrals for health education or preventive services given if needed, as noted in patient's After Visit Summary. History of Present Illness:     Patient presents for a Medicare Wellness Visit    HPI   Patient Care Team:  Elizabeth Mckinney MD as PCP - General (Family Medicine)     Review of Systems:     Review of Systems     Problem List:     Patient Active Problem List   Diagnosis   • BPH (benign prostatic hyperplasia)   • CKD (chronic kidney disease), stage III ([address])   • Essential hypertension   • Hyperlipidemia   • Type 2 diabetes mellitus with diabetic chronic kidney disease ([address])   • Uric acid nephrolithiasis   • PMR (polymyalgia rheumatica) ([address])   • Rheumatoid factor positive      Past Medical and Surgical History:     Past Medical History:   Diagnosis Date   • CKD (chronic kidney disease) 2021   • Diabetes type 2, controlled ([address]) 2002   • Hypertension 1984   • Kidney stone 1980    did have this removed   • PMR (polymyalgia rheumatica) ([address]) 7/14/2023    Diagnosed 6/23 with elevated sed rate and CRP   • Rheumatoid arthritis involving multiple sites ([address]) 7/14/2023    Positive rheumatoid factor 40   • Rheumatoid factor positive 7/14/2023     No past surgical history on file.    Family History:     Family History   Problem Relation Age of Onset   • Atrial fibrillation Mother    • Kidney disease Father    • Kidney disease Brother    • Cancer Maternal Grandmother    • Kidney disease Paternal Grandmother       Social History:     Social History     Socioeconomic History   • Marital status: /Civil Union     Spouse name: None   • Number of children: None   • Years of education: None   • Highest education level: None   Occupational History   • None   Tobacco Use   • Smoking status: Never   • Smokeless tobacco: Never   Vaping Use   • Vaping Use: Never used   Substance and Sexual Activity   • Alcohol use: Not Currently   • Drug use: Never   • Sexual activity: None   Other Topics Concern   • None   Social History Narrative     since 1968. 2 children. 7 grandchildren. 3 live nearby, 4 live in [place]. 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 Enjoys walking, visiting friends and grandkids. 25-30 miles a week. Social Determinants of Health     Financial Resource Strain: Not on file   Food Insecurity: Not on file   Transportation Needs: Not on file   Physical Activity: Not on file   Stress: Not on file   Social Connections: Not on file   Intimate Partner Violence: Not on file   Housing Stability: Not on file      Medications and Allergies:     Current Outpatient Medications   Medication Sig Dispense Refill   • Empagliflozin (JARDIANCE) 10 MG TABS tablet Take 1 tablet (10 mg total) by mouth every morning 30 tablet 5   • glipiZIDE (GLUCOTROL XL) 5 mg 24 hr tablet Take 1 tablet (5 mg total) by mouth daily 90 tablet 3   • glucose blood test strip Test dasily 100 strip 5   • OneTouch Delica Lancets 98D MISC Use 1 Lancet 4 (four) times a day 200 each 5   • predniSONE 10 mg tablet Pt taking half     • semaglutide, 1 mg/dose, (Ozempic) 4 mg/3 mL injection pen Inject 0.75 mL (1 mg total) under the skin once a week 3 mL 5   • simvastatin (ZOCOR) 20 mg tablet Take 1 tablet (20 mg total) by mouth in the morning 90 tablet 3   • tamsulosin (FLOMAX) 0.4 mg Take 1 capsule (0.4 mg total) by mouth daily with dinner 90 capsule 3     No current facility-administered medications for this visit.      No Known Allergies   Immunizations:     Immunization History   Administered Date(s) Administered   • COVID-19 MODERNA VACC 0.5 ML IM 03/19/2021, 04/16/2021, 12/13/2021, 04/22/2022   • COVID-19 Moderna Vac BIVALENT 12 Yr+ IM (BOOSTER ONLY) 0.5 ML 04/17/2023   • INFLUENZA 11/23/2010, 12/08/2011, 12/13/2012, 11/27/2013, 10/02/2014, 10/21/2020   • Influenza Split High Dose Preservative Free IM 01/06/2016, 12/21/2016, 10/05/2017, 11/21/2018, 12/04/2019   • Influenza, Seasonal Vaccine, Quadrivalent, Adjuvanted, . 5e 10/21/2020   • Influenza, seasonal, injectable 10/20/2022   • Pneumococcal Conjugate 13-Valent 09/02/2015   • Pneumococcal Polysaccharide PPV23 11/27/2009, 03/12/2018   • Zoster 10/08/2012      Health Maintenance:         Topic Date Due   • Hepatitis C Screening  Never done   • Colorectal Cancer Screening  Discontinued         Topic Date Due   • COVID-19 Vaccine (6 - Moderna series) 08/17/2023   • Influenza Vaccine (1) 09/01/2023      Medicare Screening Tests and Risk Assessments:     John Vieira is here for his Subsequent Wellness visit. Health Risk Assessment:   Patient rates overall health as good. Patient feels that their physical health rating is slightly worse. Patient is satisfied with their life. Eyesight was rated as same. Hearing was rated as same. Patient feels that their emotional and mental health rating is same. Patients states they are never, rarely angry. Patient states they are never, rarely unusually tired/fatigued. Pain experienced in the last 7 days has been some. Patient's pain rating has been 5/10. Patient states that he has experienced no weight loss or gain in last 6 months. Fall Risk Screening: In the past year, patient has experienced: no history of falling in past year      Home Safety:  Patient does not have trouble with stairs inside or outside of their home. Patient has working smoke alarms and has working carbon monoxide detector. Home safety hazards include: none. Nutrition:   Current diet is Low Carb and Diabetic. Medications:   Patient is currently taking over-the-counter supplements.  OTC medications include: see medication list. Patient is able to manage medications. Activities of Daily Living (ADLs)/Instrumental Activities of Daily Living (IADLs):   Walk and transfer into and out of bed and chair?: Yes  Dress and groom yourself?: Yes    Bathe or shower yourself?: Yes    Feed yourself? Yes  Do your laundry/housekeeping?: Yes  Manage your money, pay your bills and track your expenses?: Yes  Make your own meals?: Yes    Do your own shopping?: Yes    Previous Hospitalizations:   Any hospitalizations or ED visits within the last 12 months?: No      Advance Care Planning:   Living will: Yes    Durable POA for healthcare: Yes    Advanced directive: Yes      PREVENTIVE SCREENINGS      Cardiovascular Screening:    General: Screening Not Indicated and History Lipid Disorder      Diabetes Screening:     General: Screening Not Indicated and History Diabetes      Prostate Cancer Screening:    General: Screening Not Indicated      Lung Cancer Screening:     General: Screening Not Indicated    Screening, Brief Intervention, and Referral to Treatment (SBIRT)    Screening  Typical number of drinks in a day: 0  Typical number of drinks in a week: 0  Interpretation: Low risk drinking behavior. Single Item Drug Screening:  How often have you used an illegal drug (including marijuana) or a prescription medication for non-medical reasons in the past year? never    Single Item Drug Screen Score: 0  Interpretation: Negative screen for possible drug use disorder    No results found.      Physical Exam:     /66 (BP Location: Left arm, Patient Position: Sitting, Cuff Size: Standard)   Pulse 65   Temp 97.9 °F (36.6 °C) (Temporal)   Ht 5' 8.75" (1.746 m)   Wt 72.6 kg (160 lb)   SpO2 100%   BMI 23.80 kg/m²     Physical Exam     Laverne Vega MD

## 2023-10-23 ENCOUNTER — OFFICE VISIT (OUTPATIENT)
Dept: FAMILY MEDICINE CLINIC | Facility: CLINIC | Age: 78
End: 2023-10-23
Payer: MEDICARE

## 2023-10-23 VITALS
WEIGHT: 163.2 LBS | BODY MASS INDEX: 24.17 KG/M2 | OXYGEN SATURATION: 100 % | DIASTOLIC BLOOD PRESSURE: 76 MMHG | SYSTOLIC BLOOD PRESSURE: 146 MMHG | TEMPERATURE: 97.9 F | HEIGHT: 69 IN | HEART RATE: 71 BPM

## 2023-10-23 DIAGNOSIS — N18.31 TYPE 2 DIABETES MELLITUS WITH STAGE 3A CHRONIC KIDNEY DISEASE, WITHOUT LONG-TERM CURRENT USE OF INSULIN (HCC): ICD-10-CM

## 2023-10-23 DIAGNOSIS — M35.3 PMR (POLYMYALGIA RHEUMATICA) (HCC): Primary | ICD-10-CM

## 2023-10-23 DIAGNOSIS — E11.22 TYPE 2 DIABETES MELLITUS WITH STAGE 3A CHRONIC KIDNEY DISEASE, WITHOUT LONG-TERM CURRENT USE OF INSULIN (HCC): ICD-10-CM

## 2023-10-23 DIAGNOSIS — N18.31 STAGE 3A CHRONIC KIDNEY DISEASE (HCC): ICD-10-CM

## 2023-10-23 LAB — SL AMB POCT HEMOGLOBIN AIC: 9.2 (ref ?–6.5)

## 2023-10-23 PROCEDURE — 99214 OFFICE O/P EST MOD 30 MIN: CPT | Performed by: FAMILY MEDICINE

## 2023-10-23 PROCEDURE — 83036 HEMOGLOBIN GLYCOSYLATED A1C: CPT | Performed by: FAMILY MEDICINE

## 2023-10-23 RX ORDER — METFORMIN HYDROCHLORIDE 500 MG/1
500 TABLET, EXTENDED RELEASE ORAL
Qty: 90 TABLET | Refills: 3 | Status: SHIPPED | OUTPATIENT
Start: 2023-10-23

## 2023-10-23 NOTE — PROGRESS NOTES
Chief Complaint   Patient presents with    Follow-up     2 month         HPI   Here for follow-up of diabetes, chronic kidney disease, and PMR. Prednisone has been decreased to 5 mg daily and his PMR seems to be controlled. At last visit, Glucotrol increased to 10 mg daily. Jardiance increased to 25 mg. Also using Ozempic 1 mg weekly. Today's A1c is 9.2.  2 months ago, A1c was 8.2. As far as PMR, feels his symptoms are 90% improved. Past Medical History:   Diagnosis Date    CKD (chronic kidney disease) 2021    Diabetes type 2, controlled ([address] 2002    Hypertension 1984    Kidney stone 1980    did have this removed    PMR (polymyalgia rheumatica) ([facility]) 7/14/2023    Diagnosed 6/23 with elevated sed rate and CRP    Rheumatoid arthritis involving multiple sites ([address] [place] [address]) 7/14/2023    Positive rheumatoid factor 40    Rheumatoid factor positive 7/14/2023        History reviewed. No pertinent surgical history. Social History     Tobacco Use    Smoking status: Never    Smokeless tobacco: Never   Substance Use Topics    Alcohol use: Not Currently       Social History     Social History Narrative     since 1968. 2 children. 7 grandchildren. 3 live nearby, 4 live in [place]. 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 Enjoys walking, visiting friends and grandkids. 25-30 miles a week. The following portions of the patient's history were reviewed and updated as appropriate: allergies, current medications, past family history, past medical history, past social history, past surgical history, and problem list.      Review of Systems       /76 (BP Location: Left arm, Patient Position: Sitting, Cuff Size: Standard)   Pulse 71   Temp 97.9 °F (36.6 °C) (Temporal)   Ht 5' 8.75" (1.746 m)   Wt 74 kg (163 lb 3.2 oz)   SpO2 100%   BMI 24.28 kg/m²      Physical Exam   Appears well.                Current Outpatient Medications:     Empagliflozin 25 MG TABS, Take 1 tablet (25 mg total) by mouth daily, Disp: 30 tablet, Rfl: 5    glipiZIDE (GLUCOTROL XL) 10 mg 24 hr tablet, 2 tablets daily in the morning, Disp: 180 tablet, Rfl: 3    glucose blood test strip, Test dasily, Disp: 100 strip, Rfl: 5    metFORMIN (GLUCOPHAGE-XR) 500 mg 24 hr tablet, Take 1 tablet (500 mg total) by mouth daily with breakfast, Disp: 90 tablet, Rfl: 3    NON FORMULARY, Take 750 mg by mouth in the morning CURCUMIN, Disp: , Rfl:     OneTouch Delica Lancets 15V MISC, Use 1 Lancet 4 (four) times a day, Disp: 200 each, Rfl: 5    predniSONE 10 mg tablet, Pt taking half, Disp: , Rfl:     semaglutide, 2 mg/dose, (Ozempic, 2 MG/DOSE,) 8 mg/ mL injection pen, Inject 0.75 mL (2 mg total) under the skin every 7 days, Disp: 3 mL, Rfl: 5    simvastatin (ZOCOR) 20 mg tablet, Take 1 tablet (20 mg total) by mouth in the morning, Disp: 90 tablet, Rfl: 3    tamsulosin (FLOMAX) 0.4 mg, Take 1 capsule (0.4 mg total) by mouth daily with dinner, Disp: 90 capsule, Rfl: 3     No problem-specific Assessment & Plan notes found for this encounter. Diagnoses and all orders for this visit:    PMR (polymyalgia rheumatica) ([address])    Type 2 diabetes mellitus with stage 3a chronic kidney disease, without long-term current use of insulin ([facility])  -     semaglutide, 2 mg/dose, (Ozempic, 2 MG/DOSE,) 8 mg/ mL injection pen; Inject 0.75 mL (2 mg total) under the skin every 7 days  -     metFORMIN (GLUCOPHAGE-XR) 500 mg 24 hr tablet; Take 1 tablet (500 mg total) by mouth daily with breakfast    Stage 3a chronic kidney disease ([address])    Other orders  -     NON FORMULARY; Take 750 mg by mouth in the morning CURCUMIN        Patient Instructions   A1c is 9.2. Will be seeing rheumatology in the next couple of weeks. Consideration to tapering prednisone and possibly initiating methotrexate. In the meantime, increase Glucotrol to 20 mg daily and increase Ozempic to 2 mg weekly. Consideration to adding short acting insulin.   Consideration to using a CGM which has been discussed before. He does a great job in checking his sugars 4 times a day. Could get more information more easily with his CGM. Review of previous use of metformin 2000 mg daily which was stopped when he had acute renal injury with elevated potassium. Restart metformin at 500 mg daily. Recheck in 3 months.

## 2023-10-23 NOTE — PATIENT INSTRUCTIONS
A1c is 9.2. Will be seeing rheumatology in the next couple of weeks. Consideration to tapering prednisone and possibly initiating methotrexate. In the meantime, increase Glucotrol to 20 mg daily and increase Ozempic to 2 mg weekly. Consideration to adding short acting insulin. Consideration to using a CGM which has been discussed before. He does a great job in checking his sugars 4 times a day. Could get more information more easily with his CGM. Review of previous use of metformin 2000 mg daily which was stopped when he had acute renal injury with elevated potassium. Restart metformin at 500 mg daily. Recheck in 3 months.

## 2023-11-15 ENCOUNTER — TELEPHONE (OUTPATIENT)
Dept: FAMILY MEDICINE CLINIC | Facility: CLINIC | Age: 78
End: 2023-11-15

## 2023-11-24 ENCOUNTER — OFFICE VISIT (OUTPATIENT)
Dept: URGENT CARE | Facility: MEDICAL CENTER | Age: 78
End: 2023-11-24
Payer: MEDICARE

## 2023-11-24 VITALS
OXYGEN SATURATION: 99 % | WEIGHT: 155 LBS | TEMPERATURE: 101.7 F | HEART RATE: 94 BPM | SYSTOLIC BLOOD PRESSURE: 178 MMHG | HEIGHT: 68 IN | RESPIRATION RATE: 20 BRPM | BODY MASS INDEX: 23.49 KG/M2 | DIASTOLIC BLOOD PRESSURE: 83 MMHG

## 2023-11-24 DIAGNOSIS — U07.1 COVID-19: Primary | ICD-10-CM

## 2023-11-24 PROCEDURE — G0463 HOSPITAL OUTPT CLINIC VISIT: HCPCS | Performed by: ORTHOPAEDIC SURGERY

## 2023-11-24 PROCEDURE — 99203 OFFICE O/P NEW LOW 30 MIN: CPT | Performed by: ORTHOPAEDIC SURGERY

## 2023-11-24 RX ORDER — NIRMATRELVIR AND RITONAVIR 150-100 MG
2 KIT ORAL 2 TIMES DAILY
Qty: 20 TABLET | Refills: 0 | Status: SHIPPED | OUTPATIENT
Start: 2023-11-24 | End: 2023-11-24 | Stop reason: CLARIF

## 2023-11-24 RX ORDER — NIRMATRELVIR AND RITONAVIR 300-100 MG
3 KIT ORAL 2 TIMES DAILY
Qty: 30 TABLET | Refills: 0 | Status: SHIPPED | OUTPATIENT
Start: 2023-11-24 | End: 2023-11-29

## 2023-11-24 RX ORDER — VIT C/B6/B5/MAGNESIUM/HERB 173 50-5-6-5MG
750 CAPSULE ORAL DAILY
COMMUNITY

## 2023-11-24 NOTE — PROGRESS NOTES
[facility]        NAME: Lan Paez is a 69 y.o. male  : 1945    MRN: [id number]  DATE: 2023  TIME: 11:16 AM    Assessment and Plan   COVID-19 [U07.1]  1. COVID-19  nirmatrelvir & ritonavir (Paxlovid, 300/100,) tablet therapy pack    DISCONTINUED: nirmatrelvir & ritonavir (Paxlovid, 150/100,) tablet therapy pack        Spoke with Laisha Alejandre pharmacist via phone, who advised me that they have their own decreased kidney function Paxlovid pack and I should place an order for the normal Paxlovid dose script with attn to kidney function in comments. Patient made aware that he should double check with the pharmacist that he is receiving the correct dose due to his kidney function status. He was also instructed to discontinue his simvastatin while taking Paxlovid, just like his PCP instructed last year. Patient verbalized understanding. Patient Instructions     Take Paxlovid as prescribed   Make sure with the pharmacy that it is the decreased kidney function dosing  Do not take your simvastatin while taking Paxlovid  Ibuprofen/Motrin 600mg every 6 hours for fever, headaches, body aches   Ibuprofen is an NSAID. Please stop medication if you experience stomach/abdominal pain and report to your primary care provider. Ask your primary care provider before you take NSAIDs if you are on any blood thinners, or if you have a history of heart disease, kidney disease, gastric bypass surgery, GI bleed, or poorly controlled high blood pressure. May use acetaminophen/Tylenol as directed on the bottle between doses of ibuprofen. Do not exceed 4,000mg of Tylenol a day. Cough:  Guaifenesin/Mucinex as directed on the bottle for congestion and mucous-y cough.    Dextromethorphan/Delsym for dry cough and cough suppression   Combination cough and cold such as Dimetapp also available  If prescribed, take Tessalon Pearles or Bromfed as directed  Cepacol lozenges, "throat coat" tea for sore throat  Vitamin/Minerals:  Vitamin D3 2,000 IU daily  Vitamin C 1000mg twice a day  Some studies suggest that Zinc 12.5-15mg every 2 hours while awake for 5 days may shorten symptom duration by 1-2 days  Other: Plenty of fluids and rest  Follow up with PCP in 3-5 days    Chief Complaint     Chief Complaint   Patient presents with    Cold Like Symptoms     Tested positive for covid via home test. States over a year ago when he had covid he was prescribed medication and unsure if he needs that today. PCP closed         History of Present Illness       42-year-old male presents the urgent care for evaluation of COVID. The patient reports Wednesday he developed symptoms of a cold including sore throat, cough, congestion, fatigue, body aches, fever. This morning he tested positive for COVID. He notes last year he had a COVID infection and his family doctor prescribed him Paxlovid. He is hoping today for packs of it again as he feels it works very well for him. The patient notes he did attempt to contact his PCP today, though they were out of office. He has a history of diabetes, chronic kidney disease, polymyalgia rheumatica. Patient denies any history of asthma or COPD. He denies any shortness of breath, dizziness, heart palpitations. Review of Systems   Review of Systems   Constitutional:  Positive for fatigue and fever. Negative for chills. HENT:  Positive for congestion, postnasal drip and sore throat. Negative for ear pain. Eyes:  Negative for pain and visual disturbance. Respiratory:  Positive for cough. Negative for chest tightness, shortness of breath and wheezing. Cardiovascular:  Negative for chest pain and palpitations. Gastrointestinal:  Negative for abdominal pain, diarrhea, nausea and vomiting. Genitourinary:  Negative for dysuria and hematuria. Musculoskeletal:  Positive for myalgias. Negative for arthralgias and back pain. Skin:  Negative for color change and rash. Neurological:  Negative for dizziness, seizures, syncope and headaches. All other systems reviewed and are negative.         Current Medications       Current Outpatient Medications:     nirmatrelvir & ritonavir (Paxlovid, 300/100,) tablet therapy pack, Take 3 tablets by mouth 2 (two) times a day for 5 days Take 2 nirmatrelvir tablets + 1 ritonavir tablet together per dose, Disp: 30 tablet, Rfl: 0    Turmeric (Curcumin 95) 500 MG CAPS, Take 750 mg by mouth in the morning, Disp: , Rfl:     Empagliflozin 25 MG TABS, Take 1 tablet (25 mg total) by mouth daily, Disp: 30 tablet, Rfl: 5    glipiZIDE (GLUCOTROL XL) 10 mg 24 hr tablet, 2 tablets daily in the morning, Disp: 180 tablet, Rfl: 3    glucose blood test strip, Test dasily, Disp: 100 strip, Rfl: 5    metFORMIN (GLUCOPHAGE-XR) 500 mg 24 hr tablet, Take 1 tablet (500 mg total) by mouth daily with breakfast, Disp: 90 tablet, Rfl: 3    NON FORMULARY, Take 750 mg by mouth in the morning CURCUMIN, Disp: , Rfl:     OneTouch Delica Lancets 45H MISC, Use 1 Lancet 4 (four) times a day, Disp: 200 each, Rfl: 5    predniSONE 10 mg tablet, Pt taking half, Disp: , Rfl:     semaglutide, 2 mg/dose, (Ozempic, 2 MG/DOSE,) 8 mg/ mL injection pen, Inject 0.75 mL (2 mg total) under the skin every 7 days, Disp: 3 mL, Rfl: 5    simvastatin (ZOCOR) 20 mg tablet, Take 1 tablet (20 mg total) by mouth in the morning, Disp: 90 tablet, Rfl: 3    tamsulosin (FLOMAX) 0.4 mg, Take 1 capsule (0.4 mg total) by mouth daily with dinner, Disp: 90 capsule, Rfl: 3    Current Allergies     Allergies as of 11/24/2023    (No Known Allergies)            The following portions of the patient's history were reviewed and updated as appropriate: allergies, current medications, past family history, past medical history, past social history, past surgical history and problem list.     Past Medical History:   Diagnosis Date    CKD (chronic kidney disease) 2021    Diabetes type 2, controlled ([address]) 2002 Hypertension 1984    Kidney stone 1980    did have this removed    PMR (polymyalgia rheumatica) ([address]) 7/14/2023    Diagnosed 6/23 with elevated sed rate and CRP    Rheumatoid arthritis involving multiple sites ([address]) 7/14/2023    Positive rheumatoid factor 40    Rheumatoid factor positive 7/14/2023       No past surgical history on file. Family History   Problem Relation Age of Onset    Atrial fibrillation Mother     Kidney disease Father     Kidney disease Brother     Cancer Maternal Grandmother     Kidney disease Paternal Grandmother          Medications have been verified. Objective   BP (!) 178/83   Pulse 94   Temp (!) 101.7 °F (38.7 °C)   Resp 20   Ht 5' 8" (1.727 m)   Wt 70.3 kg (155 lb)   SpO2 99%   BMI 23.57 kg/m²        Physical Exam     Physical Exam  Vitals and nursing note reviewed. Constitutional:       General: He is not in acute distress. Appearance: Normal appearance. He is not ill-appearing. HENT:      Head: Normocephalic and atraumatic. Right Ear: Tympanic membrane normal.      Left Ear: Tympanic membrane normal.      Mouth/Throat:      Pharynx: Oropharynx is clear. Eyes:      Extraocular Movements: Extraocular movements intact. Pupils: Pupils are equal, round, and reactive to light. Cardiovascular:      Rate and Rhythm: Normal rate and regular rhythm. Pulses: Normal pulses. Heart sounds: Normal heart sounds. Pulmonary:      Effort: Pulmonary effort is normal. No respiratory distress. Breath sounds: No wheezing or rhonchi. Musculoskeletal:         General: Normal range of motion. Cervical back: Normal range of motion. No rigidity or tenderness. Lymphadenopathy:      Cervical: No cervical adenopathy. Skin:     General: Skin is warm and dry. Capillary Refill: Capillary refill takes less than 2 seconds. Neurological:      General: No focal deficit present. Mental Status: He is alert and oriented to person, place, and time. Psychiatric:         Mood and Affect: Mood normal.         Behavior: Behavior normal.

## 2023-11-24 NOTE — PATIENT INSTRUCTIONS
Take Paxlovid as prescribed   Make sure with the pharmacy that it is the decreased kidney function dosing  Do not take your simvastatin while taking Paxlovid  Ibuprofen/Motrin 600mg every 6 hours for fever, headaches, body aches   Ibuprofen is an NSAID. Please stop medication if you experience stomach/abdominal pain and report to your primary care provider. Ask your primary care provider before you take NSAIDs if you are on any blood thinners, or if you have a history of heart disease, kidney disease, gastric bypass surgery, GI bleed, or poorly controlled high blood pressure. May use acetaminophen/Tylenol as directed on the bottle between doses of ibuprofen. Do not exceed 4,000mg of Tylenol a day. Cough:  Guaifenesin/Mucinex as directed on the bottle for congestion and mucous-y cough.    Dextromethorphan/Delsym for dry cough and cough suppression   Combination cough and cold such as Dimetapp also available  If prescribed, take Tessalon Pearles or Bromfed as directed  Cepacol lozenges, "throat coat" tea for sore throat  Vitamin/Minerals:  Vitamin D3 2,000 IU daily  Vitamin C 1000mg twice a day  Some studies suggest that Zinc 12.5-15mg every 2 hours while awake for 5 days may shorten symptom duration by 1-2 days  Other: Plenty of fluids and rest  Follow up with PCP in 3-5 days

## 2023-11-27 ENCOUNTER — TELEMEDICINE (OUTPATIENT)
Dept: FAMILY MEDICINE CLINIC | Facility: CLINIC | Age: 78
End: 2023-11-27
Payer: MEDICARE

## 2023-11-27 VITALS — TEMPERATURE: 98 F

## 2023-11-27 DIAGNOSIS — E11.22 TYPE 2 DIABETES MELLITUS WITH STAGE 3A CHRONIC KIDNEY DISEASE, WITHOUT LONG-TERM CURRENT USE OF INSULIN (HCC): ICD-10-CM

## 2023-11-27 DIAGNOSIS — U07.1 COVID-19 VIRUS INFECTION: Primary | ICD-10-CM

## 2023-11-27 DIAGNOSIS — N18.31 TYPE 2 DIABETES MELLITUS WITH STAGE 3A CHRONIC KIDNEY DISEASE, WITHOUT LONG-TERM CURRENT USE OF INSULIN (HCC): ICD-10-CM

## 2023-11-27 PROCEDURE — 99213 OFFICE O/P EST LOW 20 MIN: CPT | Performed by: FAMILY MEDICINE

## 2023-11-27 RX ORDER — PREDNISONE 2.5 MG/1
2.5 TABLET ORAL
COMMUNITY
Start: 2023-11-01

## 2023-11-27 NOTE — PATIENT INSTRUCTIONS
Symptoms are improving. Should be quarantined for 1 more day. Then wear a mask for the next 5 days. He is aware that as his prednisone is tapered, his sugar could go too low and he would need to back off the glipizide. Follow-up as scheduled in the office.

## 2023-11-27 NOTE — PROGRESS NOTES
Virtual Regular Visit    Verification of patient location:    Patient is located at Home in the following state in which I hold an active license [place]      Assessment/Plan:    Problem List Items Addressed This Visit          Medium    Type 2 diabetes mellitus with diabetic chronic kidney disease ([address])     Other Visit Diagnoses       COVID-19 virus infection    -  Primary          Patient Instructions   Symptoms are improving. Should be quarantined for 1 more day. Then wear a mask for the next 5 days. He is aware that as his prednisone is tapered, his sugar could go too low and he would need to back off the glipizide. Follow-up as scheduled in the office. Reason for visit is   Chief Complaint   Patient presents with    [id number]     Patient went to [address] [facility] Friday 11/24, tested covid positive was given paxlovid, had a fever of 101.7 body aches and chills over the weekend, feeling better today, still stuffy and has some fatigue    Virtual Regular Visit          Encounter provider Belinda Bradshaw MD    Provider located at [address] [place], [place] [id number] [phone]      Recent Visits  No visits were found meeting these conditions. Showing recent visits within past 7 days and meeting all other requirements  Today's Visits  Date Type Provider Dept   11/27/23 Telemedicine Belinda Bradshaw MD [facility]   Showing today's visits and meeting all other requirements  Future Appointments  No visits were found meeting these conditions. Showing future appointments within next 150 days and meeting all other requirements       The patient was identified by name and date of birth. Lien Devine was informed that this is a telemedicine visit and that the visit is being conducted through the [address] [place] [address] [employer] platform. He agrees to proceed. .  My office door was closed. No one else was in the room.   He acknowledged consent and understanding of privacy and security of the video platform. The patient has agreed to participate and understands they can discontinue the visit at any time. Patient is aware this is a billable service. Subjective  Clarita Antonio is a 69 y.o. male seen via video for follow-up of COVID-19. Was diagnosed 3 days ago at urgent care and treated with Paxlovid. Feeling better today. Still stuffy with some fatigue. Initially had fever and chills. .  Symptoms began 4 days ago. Tested positive 3 days ago. Was in [place]. Notes his sugars are improving. Past Medical History:   Diagnosis Date    CKD (chronic kidney disease) 2021    Diabetes type 2, controlled ([address] ) 2002    Hypertension 1984    Kidney stone 1980    did have this removed    PMR (polymyalgia rheumatica) ([facility]) 7/14/2023    Diagnosed 6/23 with elevated sed rate and CRP    Rheumatoid arthritis involving multiple sites ([facility]) 7/14/2023    Positive rheumatoid factor 40    Rheumatoid factor positive 7/14/2023       History reviewed. No pertinent surgical history.     Current Outpatient Medications   Medication Sig Dispense Refill    Empagliflozin 25 MG TABS Take 1 tablet (25 mg total) by mouth daily 30 tablet 5    glipiZIDE (GLUCOTROL XL) 10 mg 24 hr tablet 2 tablets daily in the morning 180 tablet 3    glucose blood test strip Test dasily 100 strip 5    metFORMIN (GLUCOPHAGE-XR) 500 mg 24 hr tablet Take 1 tablet (500 mg total) by mouth daily with breakfast 90 tablet 3    nirmatrelvir & ritonavir (Paxlovid, 300/100,) tablet therapy pack Take 3 tablets by mouth 2 (two) times a day for 5 days Take 2 nirmatrelvir tablets + 1 ritonavir tablet together per dose 30 tablet 0    NON FORMULARY Take 750 mg by mouth in the morning CURCUMIN      OneTouch Delica Lancets 51C MISC Use 1 Lancet 4 (four) times a day 200 each 5    predniSONE 2.5 mg tablet Take 2.5 mg by mouth      semaglutide, 2 mg/dose, (Ozempic, 2 MG/DOSE,) 8 mg/ mL injection pen Inject 0.75 mL (2 mg total) under the skin every 7 days 3 mL 5    tamsulosin (FLOMAX) 0.4 mg Take 1 capsule (0.4 mg total) by mouth daily with dinner 90 capsule 3    Turmeric (Curcumin 95) 500 MG CAPS Take 750 mg by mouth in the morning      simvastatin (ZOCOR) 20 mg tablet Take 1 tablet (20 mg total) by mouth in the morning (Patient not taking: Reported on 11/27/2023) 90 tablet 3     No current facility-administered medications for this visit. No Known Allergies    Review of Systems    Video Exam    Vitals:    11/27/23 1733   Temp: 98 °F (36.7 °C)   TempSrc: Oral       Physical Exam   Appears comfortable. No cough or shortness of breath noted.   Visit Time  Total Visit Duration: 7

## 2024-01-19 ENCOUNTER — RA CDI HCC (OUTPATIENT)
Dept: OTHER | Facility: HOSPITAL | Age: 79
End: 2024-01-19

## 2024-01-19 NOTE — PROGRESS NOTES
E11.65  HCC coding opportunities          Chart Reviewed number of suggestions sent to Provider: 1     Patients Insurance     Medicare Insurance: Medicare

## 2024-01-22 ENCOUNTER — OFFICE VISIT (OUTPATIENT)
Dept: FAMILY MEDICINE CLINIC | Facility: CLINIC | Age: 79
End: 2024-01-22
Payer: MEDICARE

## 2024-01-22 ENCOUNTER — LAB (OUTPATIENT)
Dept: LAB | Facility: CLINIC | Age: 79
End: 2024-01-22
Payer: MEDICARE

## 2024-01-22 VITALS
HEART RATE: 64 BPM | BODY MASS INDEX: 24.98 KG/M2 | TEMPERATURE: 97.6 F | OXYGEN SATURATION: 100 % | SYSTOLIC BLOOD PRESSURE: 148 MMHG | HEIGHT: 68 IN | DIASTOLIC BLOOD PRESSURE: 76 MMHG | WEIGHT: 164.8 LBS

## 2024-01-22 DIAGNOSIS — N18.31 TYPE 2 DIABETES MELLITUS WITH STAGE 3A CHRONIC KIDNEY DISEASE, WITHOUT LONG-TERM CURRENT USE OF INSULIN (HCC): Primary | ICD-10-CM

## 2024-01-22 DIAGNOSIS — I10 ESSENTIAL HYPERTENSION: ICD-10-CM

## 2024-01-22 DIAGNOSIS — E78.2 MIXED HYPERLIPIDEMIA: ICD-10-CM

## 2024-01-22 DIAGNOSIS — E11.22 TYPE 2 DIABETES MELLITUS WITH STAGE 3A CHRONIC KIDNEY DISEASE, WITHOUT LONG-TERM CURRENT USE OF INSULIN (HCC): Primary | ICD-10-CM

## 2024-01-22 DIAGNOSIS — N18.31 TYPE 2 DIABETES MELLITUS WITH STAGE 3A CHRONIC KIDNEY DISEASE, WITHOUT LONG-TERM CURRENT USE OF INSULIN (HCC): ICD-10-CM

## 2024-01-22 DIAGNOSIS — E11.22 TYPE 2 DIABETES MELLITUS WITH STAGE 3A CHRONIC KIDNEY DISEASE, WITHOUT LONG-TERM CURRENT USE OF INSULIN (HCC): ICD-10-CM

## 2024-01-22 DIAGNOSIS — N18.31 STAGE 3A CHRONIC KIDNEY DISEASE (HCC): ICD-10-CM

## 2024-01-22 DIAGNOSIS — M35.3 PMR (POLYMYALGIA RHEUMATICA) (HCC): ICD-10-CM

## 2024-01-22 LAB
EST. AVERAGE GLUCOSE BLD GHB EST-MCNC: 171 MG/DL
HBA1C MFR BLD: 7.6 %

## 2024-01-22 PROCEDURE — 99214 OFFICE O/P EST MOD 30 MIN: CPT | Performed by: FAMILY MEDICINE

## 2024-01-22 PROCEDURE — 83036 HEMOGLOBIN GLYCOSYLATED A1C: CPT

## 2024-01-22 PROCEDURE — 36415 COLL VENOUS BLD VENIPUNCTURE: CPT

## 2024-01-22 RX ORDER — GLIPIZIDE 10 MG/1
TABLET, FILM COATED, EXTENDED RELEASE ORAL
Start: 2024-01-22

## 2024-01-22 RX ORDER — CURCUMIN 100 %
750 POWDER (GRAM) MISCELLANEOUS
COMMUNITY

## 2024-01-22 NOTE — PATIENT INSTRUCTIONS
Check A1c at lab as office machine  is broke.   Meds are the same.  Hopefully, A1c improved off Prednisone.   Recheck 3 months.

## 2024-01-22 NOTE — PROGRESS NOTES
"Chief Complaint   Patient presents with    Follow-up     3 month         HPI   Here for follow-up of diabetes, chronic kidney disease, and PMR.   3 months ago, restarted on 500 mg of metformin.  Sugars are improved.  Fasting sugars close to 100.  Goes up before lunch.  Comes down the at bedtime.  Overall, CRP and sed rate were normal.  He was given a tapering dose of prednisone and has been off prednisone for the last 3 weeks.  Able to move his arms and legs okay although most achy at night while sleeping.  GFR was 50.      Past Medical History:   Diagnosis Date    CKD (chronic kidney disease) 2021    Diabetes type 2, controlled ([facility]) 2002    Hypertension 1984    Kidney stone 1980    did have this removed    PMR (polymyalgia rheumatica) ([facility]) 7/14/2023    Diagnosed 6/23 with elevated sed rate and CRP    Rheumatoid arthritis involving multiple sites ([facility]) 7/14/2023    Positive rheumatoid factor 40    Rheumatoid factor positive 7/14/2023        History reviewed. No pertinent surgical history.    Social History     Tobacco Use    Smoking status: Never    Smokeless tobacco: Never   Substance Use Topics    Alcohol use: Not Currently       Social History     Social History Narrative     since 1968.    2 children.  7 grandchildren.  3 live nearby, 4 live in [place].    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    Enjoys walking, visiting friends and grandkids. 25-30 miles a week.         The following portions of the patient's history were reviewed and updated as appropriate: allergies, current medications, past family history, past medical history, past social history, past surgical history, and problem list.      Review of Systems       /76 (BP Location: Left arm, Patient Position: Sitting, Cuff Size: Standard)   Pulse 64   Temp 97.6 °F (36.4 °C) (Temporal)   Ht 5' 8\" (1.727 m)   Wt 74.8 kg (164 lb 12.8 oz)   SpO2 100%   BMI 25.06 kg/m²      Physical Exam   Appears well. "   Lungs are clear.   Heart regular.   Abdomen soft.               Current Outpatient Medications:     Empagliflozin 25 MG TABS, Take 1 tablet (25 mg total) by mouth daily, Disp: 30 tablet, Rfl: 5    glipiZIDE (GLUCOTROL XL) 10 mg 24 hr tablet, 1 tablets daily in the morning, Disp: , Rfl:     glucose blood test strip, Test dasily, Disp: 100 strip, Rfl: 5    metFORMIN (GLUCOPHAGE-XR) 500 mg 24 hr tablet, Take 1 tablet (500 mg total) by mouth daily with breakfast, Disp: 90 tablet, Rfl: 3    NON FORMULARY, Take 750 mg by mouth in the morning CURCUMIN, Disp: , Rfl:     OneTouch Delica Lancets 30G MISC, Use 1 Lancet 4 (four) times a day, Disp: 200 each, Rfl: 5    semaglutide, 2 mg/dose, (Ozempic, 2 MG/DOSE,) 8 mg/ mL injection pen, Inject 0.75 mL (2 mg total) under the skin every 7 days, Disp: 3 mL, Rfl: 5    simvastatin (ZOCOR) 20 mg tablet, Take 1 tablet (20 mg total) by mouth in the morning, Disp: 90 tablet, Rfl: 3    tamsulosin (FLOMAX) 0.4 mg, Take 1 capsule (0.4 mg total) by mouth daily with dinner, Disp: 90 capsule, Rfl: 3    Turmeric, Curcuma Longa, (Curcumin) POWD, Use 750 mg, Disp: , Rfl:      No problem-specific Assessment & Plan notes found for this encounter.       Diagnoses and all orders for this visit:    Type 2 diabetes mellitus with stage 3a chronic kidney disease, without long-term current use of insulin (HCC)  -     glipiZIDE (GLUCOTROL XL) 10 mg 24 hr tablet; 1 tablets daily in the morning  -     Hemoglobin A1C; Future    Essential hypertension    PMR (polymyalgia rheumatica) (HCC)    Mixed hyperlipidemia    Stage 3a chronic kidney disease (HCC)    Other orders  -     Turmeric, Curcuma Longa, (Curcumin) POWD; Use 750 mg        Patient Instructions   Check A1c at lab as office machine  is broke.   Meds are the same.  Hopefully, A1c improved off Prednisone.   Recheck 3 months.

## 2024-01-23 ENCOUNTER — TELEPHONE (OUTPATIENT)
Age: 79
End: 2024-01-23

## 2024-01-23 ENCOUNTER — TELEPHONE (OUTPATIENT)
Dept: FAMILY MEDICINE CLINIC | Facility: CLINIC | Age: 79
End: 2024-01-23

## 2024-01-23 NOTE — RESULT ENCOUNTER NOTE
Please notify patient that A1c is improved at 7.6, down from 9.2.  Will probably be better once he clears 3 months of prednisone.  Continue same medications.

## 2024-01-23 NOTE — TELEPHONE ENCOUNTER
----- Message from Kei Morgan MD sent at 1/23/2024  8:31 AM EST -----  Please notify patient that A1c is improved at 7.6, down from 9.2.  Will probably be better once he clears 3 months of prednisone.  Continue same medications.

## 2024-02-19 DIAGNOSIS — N18.31 TYPE 2 DIABETES MELLITUS WITH STAGE 3A CHRONIC KIDNEY DISEASE, WITHOUT LONG-TERM CURRENT USE OF INSULIN (HCC): ICD-10-CM

## 2024-02-19 DIAGNOSIS — E11.22 TYPE 2 DIABETES MELLITUS WITH STAGE 3A CHRONIC KIDNEY DISEASE, WITHOUT LONG-TERM CURRENT USE OF INSULIN (HCC): ICD-10-CM

## 2024-02-19 NOTE — TELEPHONE ENCOUNTER
Reason for call:   [x] Refill   [] Prior Auth  [] Other:     Office:   [x] PCP/Provider -     Kei Morgan MD     [] Specialty/Provider -     Medication: Empagliflozin 25 MG TABS ()     Dose/Frequency: 25 mg, Oral, Daily     Quantity: 30    [id number] [address]     Does the patient have enough for 3 days?   [x] Yes   [] No - Send as HP to POD

## 2024-02-21 ENCOUNTER — TELEPHONE (OUTPATIENT)
Age: 79
End: 2024-02-21

## 2024-02-21 ENCOUNTER — TELEPHONE (OUTPATIENT)
Dept: FAMILY MEDICINE CLINIC | Facility: CLINIC | Age: 79
End: 2024-02-21

## 2024-02-21 NOTE — TELEPHONE ENCOUNTER
Patient came in office stating that Tati kyraod him that he didn't need an appt for the cataract surgery next month.  He wanted to drop off the form then have the doctor fill it out.  It states on the form that he needs the appt within 30 days and this is normal policy.  He scheduled an appt.

## 2024-02-27 ENCOUNTER — CONSULT (OUTPATIENT)
Dept: FAMILY MEDICINE CLINIC | Facility: CLINIC | Age: 79
End: 2024-02-27
Payer: MEDICARE

## 2024-02-27 VITALS
DIASTOLIC BLOOD PRESSURE: 74 MMHG | HEART RATE: 70 BPM | SYSTOLIC BLOOD PRESSURE: 120 MMHG | BODY MASS INDEX: 25.22 KG/M2 | HEIGHT: 68 IN | TEMPERATURE: 96.8 F | OXYGEN SATURATION: 97 % | WEIGHT: 166.4 LBS

## 2024-02-27 DIAGNOSIS — Z01.818 PREOPERATIVE CLEARANCE: Primary | ICD-10-CM

## 2024-02-27 PROCEDURE — 99214 OFFICE O/P EST MOD 30 MIN: CPT | Performed by: FAMILY MEDICINE

## 2024-02-27 NOTE — PROGRESS NOTES
[facility] PRACTICE PRE-OPERATIVE EVALUATION  [facility] PRIMARY CARE    NAME: Kamari Spence  AGE: 78 y.o. SEX: male  : 1945   DATE: 2024    [facility] Practice Pre-Operative Evaluation      Chief Complaint: Pre-operative Evaluation  Surgery: Left eye cataract surgery   Anticipated Date of Surgery: 3/11/2024  Referring Provider:Dr Yaa Woodruff  History of Present Illness:     Kamari Spence is a 78 y.o. male who presents to the office today for a preoperative consultation at the request of surgeon, Dr Yaa Woodruff, who plans on performing left eye cataract surgery on 3/11/2024. Planned anesthesia is  MAC Anesthesia/Topical . Patient has a bleeding risk of: no recent abnormal bleeding. Current anti-platelet/anti-coagulation medications that the patient is prescribed includes:  None .      Assessment of Chronic Conditions:   Type 2 diabetes mellitus: Improved from 9.2 to 7.6 however patient was on prednisone for PMR. Continue current regimen of Jardiance 25mg, Glipizide 10mg, metformin 500mg and Ozempic 2mg weekly   - Dyslipidemia: Continue simvastatin 20mg daily   - BPH: Currently on flomax 0.4mg   - PMR: Follows with Rhuematology     Assessment of Cardiac Risk:  Denies unstable or severe angina or MI in the last 6 weeks or history of stent placement in the last year   Denies decompensated heart failure (e.g. New onset heart failure, NYHA functional class IV heart failure, or worsening existing heart failure)  Denies significant arrhythmias such as high grade AV block, symptomatic ventricular arrhythmia, newly recognized ventricular tachycardia, supraventricular tachycardia with resting heart rate >100, or symptomatic bradycardia  Denies severe heart valve disease including aortic stenosis or symptomatic mitral stenosis     Exercise Capacity:  Able to walk 4 blocks without symptoms?: Yes  Able to walk 2 flights without symptoms?: Yes    Prior Anesthesia Reactions:  No      History of steroid use for >2 weeks within last year? Yes         Review of Systems:     Review of Systems   Constitutional: Negative.    HENT: Negative.     Eyes:  Positive for visual disturbance.   Respiratory: Negative.     Cardiovascular: Negative.    Gastrointestinal: Negative.    Musculoskeletal: Negative.    Skin: Negative.    Neurological: Negative.    Psychiatric/Behavioral: Negative.         Current Problem List:     Patient Active Problem List   Diagnosis    BPH (benign prostatic hyperplasia)    CKD (chronic kidney disease), stage III ([facility])    Essential hypertension    Hyperlipidemia    Type 2 diabetes mellitus with diabetic chronic kidney disease ([facility])    Uric acid nephrolithiasis    PMR (polymyalgia rheumatica) ([facility])    Rheumatoid factor positive       Allergies:     No Known Allergies    Current Medications:       Current Outpatient Medications:     Empagliflozin 25 MG TABS, Take 1 tablet (25 mg total) by mouth daily, Disp: 30 tablet, Rfl: 5    glipiZIDE (GLUCOTROL XL) 10 mg 24 hr tablet, 1 tablets daily in the morning, Disp: , Rfl:     glucose blood test strip, Test dasily, Disp: 100 strip, Rfl: 5    metFORMIN (GLUCOPHAGE-XR) 500 mg 24 hr tablet, Take 1 tablet (500 mg total) by mouth daily with breakfast, Disp: 90 tablet, Rfl: 3    NON FORMULARY, Take 750 mg by mouth in the morning CURCUMIN, Disp: , Rfl:     OneTouch Delica Lancets 30G MISC, Use 1 Lancet 4 (four) times a day, Disp: 200 each, Rfl: 5    semaglutide, 2 mg/dose, (Ozempic, 2 MG/DOSE,) 8 mg/ mL injection pen, Inject 0.75 mL (2 mg total) under the skin every 7 days, Disp: 3 mL, Rfl: 5    simvastatin (ZOCOR) 20 mg tablet, Take 1 tablet (20 mg total) by mouth in the morning, Disp: 90 tablet, Rfl: 3    tamsulosin (FLOMAX) 0.4 mg, Take 1 capsule (0.4 mg total) by mouth daily with dinner, Disp: 90 capsule, Rfl: 3    Past Medical History:       Past Medical History:   Diagnosis Date    CKD (chronic kidney disease) 2021    Diabetes type 2,  controlled ([facility]) 2002    Hypertension 1984    Kidney stone 1980    did have this removed    PMR (polymyalgia rheumatica) ([facility]) 7/14/2023    Diagnosed 6/23 with elevated sed rate and CRP    Rheumatoid arthritis involving multiple sites ([facility]) 7/14/2023    Positive rheumatoid factor 40    Rheumatoid factor positive 7/14/2023        No past surgical history on file.     Family History   Problem Relation Age of Onset    Atrial fibrillation Mother     Kidney disease Father     Kidney disease Brother     Cancer Maternal Grandmother     Kidney disease Paternal Grandmother         Social History     Socioeconomic History    Marital status: /Civil Union     Spouse name: Not on file    Number of children: Not on file    Years of education: Not on file    Highest education level: Not on file   Occupational History    Not on file   Tobacco Use    Smoking status: Never    Smokeless tobacco: Never   Vaping Use    Vaping status: Never Used   Substance and Sexual Activity    Alcohol use: Not Currently    Drug use: Never    Sexual activity: Not on file   Other Topics Concern    Not on file   Social History Narrative     since 1968.    2 children.  7 grandchildren.  3 live nearby, 4 live in [place].    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    Enjoys walking, visiting friends and grandkids. 25-30 miles a week.      Social Determinants of Health     Financial Resource Strain: Low Risk  (8/21/2023)    Overall Financial Resource Strain (CARDIA)     Difficulty of Paying Living Expenses: Not hard at all   Food Insecurity: Not on file   Transportation Needs: No Transportation Needs (8/21/2023)    PRAPARE - Transportation     Lack of Transportation (Medical): No     Lack of Transportation (Non-Medical): No   Physical Activity: Not on file   Stress: Not on file   Social Connections: Not on file   Intimate Partner Violence: Not on file   Housing Stability: Not on file        Physical Exam:     BP  "120/74 (BP Location: Left arm, Patient Position: Sitting, Cuff Size: Adult)   Pulse 70   Temp (!) 96.8 °F (36 °C) (Temporal)   Ht 5' 8\" (1.727 m)   Wt 75.5 kg (166 lb 6.4 oz)   SpO2 97%   BMI 25.30 kg/m²     Physical Exam  Constitutional:       General: He is not in acute distress.     Appearance: He is not ill-appearing.   HENT:      Head: Normocephalic and atraumatic.      Mouth/Throat:      Mouth: Mucous membranes are moist.      Pharynx: No oropharyngeal exudate or posterior oropharyngeal erythema.   Eyes:      General:         Right eye: No discharge.         Left eye: No discharge.      Extraocular Movements: Extraocular movements intact.      Pupils: Pupils are equal, round, and reactive to light.   Cardiovascular:      Rate and Rhythm: Normal rate.   Pulmonary:      Effort: Pulmonary effort is normal. No respiratory distress.      Breath sounds: No wheezing.   Abdominal:      General: Bowel sounds are normal. There is no distension.      Palpations: Abdomen is soft.      Tenderness: There is no abdominal tenderness.   Musculoskeletal:      Right lower leg: No edema.      Left lower leg: No edema.   Neurological:      General: No focal deficit present.      Mental Status: He is alert.   Psychiatric:         Mood and Affect: Mood normal.         Behavior: Behavior normal.          Data:     Pre-operative work-up    None requested      Previous cardiopulmonary studies within the past year:  Echocardiogram: None  Cardiac Catheterization: None  Stress Test: None  Pulmonary Function Testing: None      Assessment & Recommendations:     1. Preoperative clearance            Pre-Op Evaluation Assessment  78 y.o. male with planned surgery: Left cataract eye surgery.    Known risk factors for perioperative complications: Diabetes mellitus.        Current medications which may produce withdrawal symptoms if withheld perioperatively: None.    Pre-Op Evaluation Plan  1. Further preoperative workup as follows:   - " None; no further preoperative work-up is required    2. Medication Management/Recommendations:   Patient advised to hold flomax prior to surgery to reduce risk of floppy iris syndrome    3. Prophylaxis for cardiac events with perioperative beta-blockers: not indicated.    4. Patient requires further consultation with: None    Clearance  Patient is medically optimized for surgery      Dunia Mcnair MD  [facility]  [address] [place] [address] DR ELIAS [address] [place] [id number]  Phone#  [phone]  Fax#  [phone]

## 2024-03-05 ENCOUNTER — TELEPHONE (OUTPATIENT)
Age: 79
End: 2024-03-05

## 2024-03-05 NOTE — TELEPHONE ENCOUNTER
Upon reviewing the pre-op clearance done 2/27, the eye surgery center stated they have to [facility] his cataract surgery date to 3/18.      Per clearance, the only medication listed that needed to be stopped was Flomax.  However, the surgeon states he needs to be off the Ozempic for 2 weeks as well (Pt took dose on 3/4) .    The surgeon advised patient can continue the glipizide, metformin and empagliflozin up until the day of the surgery (but not on the day).    Surgeon advised patient reach out to PCP to be sure the doctor is in agreement.    Please advise.

## 2024-03-19 ENCOUNTER — TELEPHONE (OUTPATIENT)
Age: 79
End: 2024-03-19

## 2024-03-19 NOTE — TELEPHONE ENCOUNTER
Pt called and wanted Dr. Mcnair to review his past history because I scheduled him for an appt with her tomorrow. Pt states his blood suger has been okay after surgery and wants to know if he should go back on ozempic, he also states his blood pressure has been a little high after surgery and will give further information on his appt with Dr. Duque tomorrow.

## 2024-03-20 ENCOUNTER — OFFICE VISIT (OUTPATIENT)
Dept: FAMILY MEDICINE CLINIC | Facility: CLINIC | Age: 79
End: 2024-03-20
Payer: MEDICARE

## 2024-03-20 VITALS
DIASTOLIC BLOOD PRESSURE: 66 MMHG | OXYGEN SATURATION: 99 % | TEMPERATURE: 97 F | SYSTOLIC BLOOD PRESSURE: 138 MMHG | HEART RATE: 64 BPM | HEIGHT: 68 IN | BODY MASS INDEX: 25.46 KG/M2 | WEIGHT: 168 LBS

## 2024-03-20 DIAGNOSIS — E11.22 TYPE 2 DIABETES MELLITUS WITH STAGE 3A CHRONIC KIDNEY DISEASE, WITHOUT LONG-TERM CURRENT USE OF INSULIN (HCC): ICD-10-CM

## 2024-03-20 DIAGNOSIS — N18.31 TYPE 2 DIABETES MELLITUS WITH STAGE 3A CHRONIC KIDNEY DISEASE, WITHOUT LONG-TERM CURRENT USE OF INSULIN (HCC): ICD-10-CM

## 2024-03-20 DIAGNOSIS — I10 ESSENTIAL HYPERTENSION: Primary | ICD-10-CM

## 2024-03-20 PROCEDURE — G2211 COMPLEX E/M VISIT ADD ON: HCPCS | Performed by: FAMILY MEDICINE

## 2024-03-20 PROCEDURE — 99214 OFFICE O/P EST MOD 30 MIN: CPT | Performed by: FAMILY MEDICINE

## 2024-03-20 NOTE — PROGRESS NOTES
Assessment/Plan:    No problem-specific Assessment & Plan notes found for this encounter.       Diagnoses and all orders for this visit:    Essential hypertension    Type 2 diabetes mellitus with stage 3a chronic kidney disease, without long-term current use of insulin ([facility])        - Initial blood pressure elevated at 150/62 however repeat blood pressure was 138/66. Patient advised to check blood pressure at home and will return to office in 2 weeks for a recheck. Patient advised to also bring blood pressure cuff to appointment   - Most recent A1C did improve to 7.6. Patient has been off Ozempic and reports that his blood sugars have been well controlled. Continue current regimen of metformin, Jardiance and Glipizide and continue to monitor sugars. If they become uncontrolled restart Ozempic. Follow up as scheduled 4/22 with Dr Morgan for repeat A1C.     Subjective:      Patient ID: Kamari Spence is a 78 y.o. male.    HPI  Kamari Spence is a very pleasant 78 year old male with a past medical history of type 2 diabetes mellitus, hypertension and CKD who presents today for a follow up. Patient underwent his cataract surgery 2 days ago and since that time he has noticed that his blood pressure has been more elevated than usual in the 150's systolic. Patient was previously on medication for his hypertension several years ago but has been able to control it with diet and lifestyle modification. He also notes that he has not been on his Ozempic for the past 2 weeks and that his sugars have been good off the medication, with fasting sugars in the 90's to 110's and after meal time sugars in the 140's. He is wondering if he can continue with metformin, jardiance and glipizide.    The following portions of the patient's history were reviewed and updated as appropriate: allergies, current medications, past family history, past medical history, past social history, past surgical history, and problem list.    Review of  "Systems   Constitutional: Negative.    HENT: Negative.     Eyes: Negative.    Respiratory: Negative.     Cardiovascular: Negative.    Gastrointestinal: Negative.    Musculoskeletal: Negative.    Skin: Negative.    Neurological: Negative.    Psychiatric/Behavioral: Negative.           Objective:      /66 (BP Location: Right arm, Patient Position: Sitting, Cuff Size: Adult)   Pulse 64   Temp (!) 97 °F (36.1 °C) (Temporal)   Ht 5' 8\" (1.727 m)   Wt 76.2 kg (168 lb)   SpO2 99%   BMI 25.54 kg/m²          Physical Exam  Constitutional:       General: He is not in acute distress.     Appearance: He is not ill-appearing.   HENT:      Head: Normocephalic and atraumatic.   Eyes:      General:         Right eye: No discharge.         Left eye: No discharge.      Extraocular Movements: Extraocular movements intact.   Cardiovascular:      Rate and Rhythm: Normal rate.   Pulmonary:      Effort: Pulmonary effort is normal. No respiratory distress.      Breath sounds: No wheezing.   Neurological:      General: No focal deficit present.      Mental Status: He is alert.   Psychiatric:         Mood and Affect: Mood normal.         Behavior: Behavior normal.           "

## 2024-03-29 DIAGNOSIS — E11.22 TYPE 2 DIABETES MELLITUS WITH STAGE 3A CHRONIC KIDNEY DISEASE, WITHOUT LONG-TERM CURRENT USE OF INSULIN (HCC): ICD-10-CM

## 2024-03-29 DIAGNOSIS — N18.31 TYPE 2 DIABETES MELLITUS WITH STAGE 3A CHRONIC KIDNEY DISEASE, WITHOUT LONG-TERM CURRENT USE OF INSULIN (HCC): ICD-10-CM

## 2024-03-29 NOTE — TELEPHONE ENCOUNTER
Reason for call:   [x] Refill   [] Prior Auth  [] Other:     Office: [facility]   [x] PCP/Provider - marisol   [] Specialty/Provider -     Medication: glucose blood test strips (one touch ultra)    Dose/Frequency: Test 4 times daily     Quantity: 30 day supply     Pharmacy: [facility] pharmacy     Does the patient have enough for 3 days?   [] Yes   [x] No - Send as HP to POD

## 2024-04-08 ENCOUNTER — CLINICAL SUPPORT (OUTPATIENT)
Dept: FAMILY MEDICINE CLINIC | Facility: CLINIC | Age: 79
End: 2024-04-08

## 2024-04-08 VITALS — DIASTOLIC BLOOD PRESSURE: 80 MMHG | SYSTOLIC BLOOD PRESSURE: 196 MMHG

## 2024-04-08 DIAGNOSIS — I10 ESSENTIAL HYPERTENSION: Primary | ICD-10-CM

## 2024-04-08 NOTE — PROGRESS NOTES
Patient came in today for a recheck on blood pressure. It read 196/80, provider spoke with patient

## 2024-04-15 ENCOUNTER — RA CDI HCC (OUTPATIENT)
Dept: OTHER | Facility: HOSPITAL | Age: 79
End: 2024-04-15

## 2024-04-22 ENCOUNTER — TELEPHONE (OUTPATIENT)
Age: 79
End: 2024-04-22

## 2024-04-22 ENCOUNTER — OFFICE VISIT (OUTPATIENT)
Dept: FAMILY MEDICINE CLINIC | Facility: CLINIC | Age: 79
End: 2024-04-22
Payer: MEDICARE

## 2024-04-22 VITALS
TEMPERATURE: 97.8 F | OXYGEN SATURATION: 99 % | WEIGHT: 167.2 LBS | DIASTOLIC BLOOD PRESSURE: 70 MMHG | HEART RATE: 76 BPM | BODY MASS INDEX: 25.34 KG/M2 | HEIGHT: 68 IN | SYSTOLIC BLOOD PRESSURE: 148 MMHG

## 2024-04-22 DIAGNOSIS — E78.2 MIXED HYPERLIPIDEMIA: ICD-10-CM

## 2024-04-22 DIAGNOSIS — E11.22 TYPE 2 DIABETES MELLITUS WITH STAGE 3A CHRONIC KIDNEY DISEASE, WITHOUT LONG-TERM CURRENT USE OF INSULIN (HCC): Primary | ICD-10-CM

## 2024-04-22 DIAGNOSIS — R39.12 BENIGN PROSTATIC HYPERPLASIA WITH WEAK URINARY STREAM: ICD-10-CM

## 2024-04-22 DIAGNOSIS — I10 ESSENTIAL HYPERTENSION: ICD-10-CM

## 2024-04-22 DIAGNOSIS — N18.31 TYPE 2 DIABETES MELLITUS WITH STAGE 3A CHRONIC KIDNEY DISEASE, WITHOUT LONG-TERM CURRENT USE OF INSULIN (HCC): Primary | ICD-10-CM

## 2024-04-22 DIAGNOSIS — N40.1 BENIGN PROSTATIC HYPERPLASIA WITH WEAK URINARY STREAM: ICD-10-CM

## 2024-04-22 LAB
CREAT UR-MCNC: 32.8 MG/DL
MICROALBUMIN UR-MCNC: 268.8 MG/L
MICROALBUMIN/CREAT 24H UR: 820 MG/G CREATININE (ref 0–30)
SL AMB POCT HEMOGLOBIN AIC: 7.6 (ref ?–6.5)

## 2024-04-22 PROCEDURE — 82043 UR ALBUMIN QUANTITATIVE: CPT | Performed by: FAMILY MEDICINE

## 2024-04-22 PROCEDURE — 82570 ASSAY OF URINE CREATININE: CPT | Performed by: FAMILY MEDICINE

## 2024-04-22 PROCEDURE — 99214 OFFICE O/P EST MOD 30 MIN: CPT | Performed by: FAMILY MEDICINE

## 2024-04-22 PROCEDURE — 83036 HEMOGLOBIN GLYCOSYLATED A1C: CPT | Performed by: FAMILY MEDICINE

## 2024-04-22 RX ORDER — TAMSULOSIN HYDROCHLORIDE 0.4 MG/1
0.4 CAPSULE ORAL
Qty: 90 CAPSULE | Refills: 3 | Status: SHIPPED | OUTPATIENT
Start: 2024-04-22

## 2024-04-22 RX ORDER — GLIPIZIDE 10 MG/1
TABLET, FILM COATED, EXTENDED RELEASE ORAL
Start: 2024-04-22

## 2024-04-22 RX ORDER — SIMVASTATIN 20 MG
20 TABLET ORAL DAILY
Qty: 90 TABLET | Refills: 3 | Status: SHIPPED | OUTPATIENT
Start: 2024-04-22

## 2024-04-22 RX ORDER — METFORMIN HYDROCHLORIDE 500 MG/1
1000 TABLET, EXTENDED RELEASE ORAL
Qty: 180 TABLET | Refills: 3 | Status: SHIPPED | OUTPATIENT
Start: 2024-04-22

## 2024-04-22 RX ORDER — OLMESARTAN MEDOXOMIL 20 MG/1
20 TABLET ORAL DAILY
Qty: 90 TABLET | Refills: 3 | Status: SHIPPED | OUTPATIENT
Start: 2024-04-22

## 2024-04-22 NOTE — PROGRESS NOTES
"Chief Complaint   Patient presents with    Follow-up     Patient states BP has been high, 130 systolic recently, saw Dr Tabby PURDY   Here for follow-up of diabetes, chronic kidney disease, and PMR.   Had his cataract surgery done.  Stopped Ozempic for that surgery and has been off Ozempic ever since.  Curious as to his A1c.  Morning sugars have been great.  Sometimes, sugars can be high in the afternoon.  On Ozempic, side effects including constipation and heartburn.  Has been taking 10 mg of lisinopril for the last couple of weeks.    Past Medical History:   Diagnosis Date    CKD (chronic kidney disease) 2021    Diabetes type 2, controlled ([facility]) 2002    Hypertension 1984    Kidney stone 1980    did have this removed    PMR (polymyalgia rheumatica) ([facility]) 7/14/2023    Diagnosed 6/23 with elevated sed rate and CRP    Rheumatoid arthritis involving multiple sites ([facility]) 7/14/2023    Positive rheumatoid factor 40    Rheumatoid factor positive 7/14/2023        History reviewed. No pertinent surgical history.    Social History     Tobacco Use    Smoking status: Never    Smokeless tobacco: Never   Substance Use Topics    Alcohol use: Not Currently       Social History     Social History Narrative     since 1968.    2 children.  7 grandchildren.  3 live nearby, 4 live in [place].    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    Enjoys walking, visiting friends and grandkids. 25-30 miles a week.         The following portions of the patient's history were reviewed and updated as appropriate: allergies, current medications, past family history, past medical history, past social history, past surgical history, and problem list.      Review of Systems       /70 (BP Location: Left arm, Patient Position: Sitting, Cuff Size: Adult)   Pulse 76   Temp 97.8 °F (36.6 °C) (Temporal)   Ht 5' 8\" (1.727 m)   Wt 75.8 kg (167 lb 3.2 oz)   SpO2 99%   BMI 25.42 kg/m²      Physical " Exam  Repeat blood pressure 170/80.  Appears well.  Lungs are clear.  Heart regular with no murmur.  Good range of motion of both shoulders with some discomfort in the left shoulder with full abduction.              Current Outpatient Medications:     Empagliflozin 25 MG TABS, Take 1 tablet (25 mg total) by mouth daily, Disp: 90 tablet, Rfl: 3    glipiZIDE (GLUCOTROL XL) 10 mg 24 hr tablet, 2 tablets daily in the morning, Disp: , Rfl:     glucose blood test strip, Test dasily, Disp: 100 strip, Rfl: 1    metFORMIN (GLUCOPHAGE-XR) 500 mg 24 hr tablet, Take 2 tablets (1,000 mg total) by mouth daily with breakfast, Disp: 180 tablet, Rfl: 3    NON FORMULARY, Take 750 mg by mouth in the morning CURCUMIN, Disp: , Rfl:     olmesartan (BENICAR) 20 mg tablet, Take 1 tablet (20 mg total) by mouth daily, Disp: 90 tablet, Rfl: 3    OneTouch Delica Lancets 30G MISC, Use 1 Lancet 4 (four) times a day, Disp: 200 each, Rfl: 5    simvastatin (ZOCOR) 20 mg tablet, Take 1 tablet (20 mg total) by mouth in the morning, Disp: 90 tablet, Rfl: 3    tamsulosin (FLOMAX) 0.4 mg, Take 1 capsule (0.4 mg total) by mouth daily with dinner, Disp: 90 capsule, Rfl: 3    semaglutide, 2 mg/dose, (Ozempic, 2 MG/DOSE,) 8 mg/ mL injection pen, Inject 0.75 mL (2 mg total) under the skin every 7 days (Patient not taking: Reported on 4/22/2024), Disp: 3 mL, Rfl: 5     No problem-specific Assessment & Plan notes found for this encounter.       Diagnoses and all orders for this visit:    Type 2 diabetes mellitus with stage 3a chronic kidney disease, without long-term current use of insulin ([facility])  -     POCT hemoglobin A1c  -     metFORMIN (GLUCOPHAGE-XR) 500 mg 24 hr tablet; Take 2 tablets (1,000 mg total) by mouth daily with breakfast  -     glipiZIDE (GLUCOTROL XL) 10 mg 24 hr tablet; 2 tablets daily in the morning  -     Empagliflozin 25 MG TABS; Take 1 tablet (25 mg total) by mouth daily  -     Albumin / creatinine urine ratio    Benign prostatic  hyperplasia with weak urinary stream  -     tamsulosin (FLOMAX) 0.4 mg; Take 1 capsule (0.4 mg total) by mouth daily with dinner    Mixed hyperlipidemia  -     simvastatin (ZOCOR) 20 mg tablet; Take 1 tablet (20 mg total) by mouth in the morning    Essential hypertension  -     olmesartan (BENICAR) 20 mg tablet; Take 1 tablet (20 mg total) by mouth daily        Patient Instructions   A1c is 7.6.  Increase metformin to 1000 mg daily.  Current dose of glipizide is 20 mg daily and he is aware that that medication can possibly make sugars go too low.  If low sugars occur, cut back to 1 tablet.  Continue Jardiance 25 mg daily.  Blood pressure is too high, switch to olmesartan 20 mg daily.  Doing okay as far as PMR.  No symptoms.  Would benefit from an exercise program.  Referral to Dr. Sue Barnett's office is [address] [place] [address] phone number [phone].   Recheck in 3 months for follow-up of blood pressure and A1c.

## 2024-04-22 NOTE — PATIENT INSTRUCTIONS
A1c is 7.6.  Increase metformin to 1000 mg daily.  Current dose of glipizide is 20 mg daily and he is aware that that medication can possibly make sugars go too low.  If low sugars occur, cut back to 1 tablet.  Continue Jardiance 25 mg daily.  Blood pressure is too high, switch to olmesartan 20 mg daily.  Doing okay as far as PMR.  No symptoms.  Would benefit from an exercise program.  Referral to Dr. Sue Barnett's office is [address] [place] [address] phone number [phone].   Recheck in 3 months for follow-up of blood pressure and A1c.

## 2024-04-22 NOTE — TELEPHONE ENCOUNTER
Pt called and asked if Dr still had him on ozempic because it showed in his AVS. Clarified that . Did not prescribe any more ozempic

## 2024-05-29 DIAGNOSIS — E11.22 TYPE 2 DIABETES MELLITUS WITH STAGE 3A CHRONIC KIDNEY DISEASE, WITHOUT LONG-TERM CURRENT USE OF INSULIN (HCC): ICD-10-CM

## 2024-05-29 DIAGNOSIS — N18.31 TYPE 2 DIABETES MELLITUS WITH STAGE 3A CHRONIC KIDNEY DISEASE, WITHOUT LONG-TERM CURRENT USE OF INSULIN (HCC): ICD-10-CM

## 2024-05-29 NOTE — TELEPHONE ENCOUNTER
Reason for call:   [x] Refill   [] Prior Auth  [] Other:     Office:   [x] PCP/Provider -   [] Specialty/Provider -     Medication: BLOOD TEST STRIPS    Dose/Frequency: 100    Quantity: 100    Pharmacy:   [id number] - [place] - [address] [facility]  [address] [place] [address]  Phone: [phone]  Fax: [phone]     Does the patient have enough for 3 days?   [x] Yes   [] No - Send as HP to POD

## 2024-06-03 NOTE — TELEPHONE ENCOUNTER
Pt called wanted to confirm a that someone at [place] will sign his orders since Dr. Morgan is out all week.  Pt is very low on his strips.    See message below.

## 2024-08-05 ENCOUNTER — OFFICE VISIT (OUTPATIENT)
Dept: FAMILY MEDICINE CLINIC | Facility: CLINIC | Age: 79
End: 2024-08-05
Payer: MEDICARE

## 2024-08-05 VITALS
TEMPERATURE: 97 F | HEART RATE: 59 BPM | HEIGHT: 68 IN | SYSTOLIC BLOOD PRESSURE: 130 MMHG | BODY MASS INDEX: 25.88 KG/M2 | OXYGEN SATURATION: 98 % | WEIGHT: 170.8 LBS | DIASTOLIC BLOOD PRESSURE: 80 MMHG

## 2024-08-05 DIAGNOSIS — E11.22 TYPE 2 DIABETES MELLITUS WITH STAGE 3A CHRONIC KIDNEY DISEASE, WITHOUT LONG-TERM CURRENT USE OF INSULIN (HCC): ICD-10-CM

## 2024-08-05 DIAGNOSIS — E78.2 MIXED HYPERLIPIDEMIA: ICD-10-CM

## 2024-08-05 DIAGNOSIS — N18.31 TYPE 2 DIABETES MELLITUS WITH STAGE 3A CHRONIC KIDNEY DISEASE, WITHOUT LONG-TERM CURRENT USE OF INSULIN (HCC): ICD-10-CM

## 2024-08-05 DIAGNOSIS — N40.0 BENIGN PROSTATIC HYPERPLASIA WITHOUT LOWER URINARY TRACT SYMPTOMS: ICD-10-CM

## 2024-08-05 DIAGNOSIS — I10 ESSENTIAL HYPERTENSION: Primary | ICD-10-CM

## 2024-08-05 PROCEDURE — G2211 COMPLEX E/M VISIT ADD ON: HCPCS | Performed by: FAMILY MEDICINE

## 2024-08-05 PROCEDURE — 99214 OFFICE O/P EST MOD 30 MIN: CPT | Performed by: FAMILY MEDICINE

## 2024-08-05 NOTE — PROGRESS NOTES
"Chief Complaint   Patient presents with    Follow-up        HPI   Here for follow-up of diabetes, chronic kidney disease, and PMR.  And hypertension.  At last visit, A1c was 7.6.  Had another A1c on July 10 and was 7.1.  Metformin has been increased to 1000 mg At last visit, switch to olmesartan 20 mg daily.  Seen by Dr. Mansfield.  Blood work revealed his GFR to be 35 but BUN was elevated as he may have been dry that day.  Stopped Ozempic when he had cataract surgery in March.  Prior to that, he had regained about 11 pounds from his mattie of 155 and he was 165 before stopping Ozempic.    Past Medical History:   Diagnosis Date    CKD (chronic kidney disease) 2021    Diabetes type 2, controlled ([facility]) 2002    Hypertension 1984    Kidney stone 1980    did have this removed    PMR (polymyalgia rheumatica) ([facility]) 7/14/2023    Diagnosed 6/23 with elevated sed rate and CRP    Rheumatoid arthritis involving multiple sites ([facility]) 7/14/2023    Positive rheumatoid factor 40    Rheumatoid factor positive 7/14/2023        History reviewed. No pertinent surgical history.    Social History     Tobacco Use    Smoking status: Never    Smokeless tobacco: Never   Substance Use Topics    Alcohol use: Not Currently       Social History     Social History Narrative     since 1968.    2 children.  7 grandchildren.  3 live nearby, 4 live in [place].    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    Enjoys walking, visiting friends and grandkids. 25-30 miles a week.         The following portions of the patient's history were reviewed and updated as appropriate: allergies, current medications, past family history, past medical history, past social history, past surgical history, and problem list.      Review of Systems       /80 Comment: at home  Pulse 59   Temp (!) 97 °F (36.1 °C) (Temporal)   Ht 5' 8\" (1.727 m)   Wt 77.5 kg (170 lb 12.8 oz)   SpO2 98%   BMI 25.97 kg/m²      Physical Exam   Repeat " blood pressure 200/80 in the right arm, 176/80 left arm.  Appears well.  Lungs are clear.  Heart regular.  Last blood pressure reading at home 130 systolic.              Current Outpatient Medications:     Empagliflozin 25 MG TABS, Take 1 tablet (25 mg total) by mouth daily, Disp: 90 tablet, Rfl: 3    glipiZIDE (GLUCOTROL XL) 10 mg 24 hr tablet, 2 tablets daily in the morning, Disp: , Rfl:     glucose blood test strip, Test dasily, Disp: 100 strip, Rfl: 3    metFORMIN (GLUCOPHAGE-XR) 500 mg 24 hr tablet, Take 2 tablets (1,000 mg total) by mouth daily with breakfast, Disp: 180 tablet, Rfl: 3    NON FORMULARY, Take 750 mg by mouth in the morning CURCUMIN, Disp: , Rfl:     olmesartan (BENICAR) 20 mg tablet, Take 1 tablet (20 mg total) by mouth daily, Disp: 90 tablet, Rfl: 3    OneTouch Delica Lancets 30G MISC, Use 1 Lancet 4 (four) times a day, Disp: 200 each, Rfl: 5    simvastatin (ZOCOR) 20 mg tablet, Take 1 tablet (20 mg total) by mouth in the morning, Disp: 90 tablet, Rfl: 3    tamsulosin (FLOMAX) 0.4 mg, Take 1 capsule (0.4 mg total) by mouth daily with dinner, Disp: 90 capsule, Rfl: 3     No problem-specific Assessment & Plan notes found for this encounter.       Diagnoses and all orders for this visit:    Essential hypertension    Type 2 diabetes mellitus with stage 3a chronic kidney disease, without long-term current use of insulin ([facility])    Mixed hyperlipidemia    Benign prostatic hyperplasia without lower urinary tract symptoms        Patient Instructions   Blood pressure is too high in the office today but was acceptable when seen by nephrology and also okay at home.  Medicines stay the same and he was asked to check blood pressure a few times a week at home.  A1c improved to 7.1.  Continue regular exercise.  Medications stay the same.  Recheck in 3 months.

## 2024-08-05 NOTE — PROGRESS NOTES
Patient's shoes and socks removed.    Right Foot/Ankle   Right Foot Inspection  Skin Exam: skin normal and skin intact. No dry skin, no warmth, no callus, no erythema, no maceration, no abnormal color, no pre-ulcer, no ulcer and no callus.     Toe Exam: ROM and strength within normal limits. No swelling, no tenderness, erythema and  no right toe deformity    Sensory   Monofilament testing: intact    Vascular  The right DP pulse is 1+.     Left Foot/Ankle  Left Foot Inspection  Skin Exam: skin normal and skin intact. No dry skin, no warmth, no erythema, no maceration, normal color, no pre-ulcer, no ulcer and no callus.     Toe Exam: ROM and strength within normal limits. No swelling, no tenderness, no erythema and no left toe deformity.     Sensory   Monofilament testing: intact    Vascular  The left DP pulse is 1+.     Assign Risk Category  No deformity present  No loss of protective sensation  No weak pulses  Risk: 0

## 2024-08-05 NOTE — PATIENT INSTRUCTIONS
Blood pressure is too high in the office today but was acceptable when seen by nephrology and also okay at home.  Medicines stay the same and he was asked to check blood pressure a few times a week at home.  A1c improved to 7.1.  Continue regular exercise.  Medications stay the same.  Recheck in 3 months.

## 2024-08-27 DIAGNOSIS — N18.31 TYPE 2 DIABETES MELLITUS WITH STAGE 3A CHRONIC KIDNEY DISEASE, WITHOUT LONG-TERM CURRENT USE OF INSULIN (HCC): ICD-10-CM

## 2024-08-27 DIAGNOSIS — E11.22 TYPE 2 DIABETES MELLITUS WITH STAGE 3A CHRONIC KIDNEY DISEASE, WITHOUT LONG-TERM CURRENT USE OF INSULIN (HCC): ICD-10-CM

## 2024-08-27 NOTE — TELEPHONE ENCOUNTER
Reason for call:   [x] Refill   [] Prior Auth  [] Other:     Office:   [x] PCP/Provider - [facility]   [] Specialty/Provider -     Medication: OneTouch Delica Lancets 30G MISC     Dose/Frequency: Use 1 Lancet 4 (four) times a day     Quantity: 200    Pharmacy: [id number] - [place] - [address]     Does the patient have enough for 3 days?   [x] Yes   [] No - Send as HP to POD

## 2024-08-28 RX ORDER — LANCETS 30 GAUGE
1 EACH MISCELLANEOUS 4 TIMES DAILY
Qty: 200 EACH | Refills: 1 | Status: SHIPPED | OUTPATIENT
Start: 2024-08-28

## 2024-09-07 DIAGNOSIS — E11.22 TYPE 2 DIABETES MELLITUS WITH STAGE 3A CHRONIC KIDNEY DISEASE, WITHOUT LONG-TERM CURRENT USE OF INSULIN (HCC): ICD-10-CM

## 2024-09-07 DIAGNOSIS — N18.31 TYPE 2 DIABETES MELLITUS WITH STAGE 3A CHRONIC KIDNEY DISEASE, WITHOUT LONG-TERM CURRENT USE OF INSULIN (HCC): ICD-10-CM

## 2024-09-08 RX ORDER — GLIPIZIDE 10 MG/1
TABLET, FILM COATED, EXTENDED RELEASE ORAL
Qty: 180 TABLET | Refills: 1 | Status: SHIPPED | OUTPATIENT
Start: 2024-09-08

## 2024-11-08 ENCOUNTER — TELEPHONE (OUTPATIENT)
Dept: ADMINISTRATIVE | Facility: OTHER | Age: 79
End: 2024-11-08

## 2024-11-08 NOTE — TELEPHONE ENCOUNTER
11/08/24 3:25 PM     Chart reviewed for Hepatitis C  was/were submitted to the patient's insurance.     Delaney Raza   [facility]

## 2024-11-08 NOTE — TELEPHONE ENCOUNTER
Upon review of the In Basket request we were able to locate, review, and update the patient chart as requested for Hepatitis C .    Any additional questions or concerns should be emailed to the Practice Liaisons via the appropriate education email address, please do not reply via In Basket.    Thank you  Delaney Raza   [facility]

## 2024-11-08 NOTE — TELEPHONE ENCOUNTER
----- Message from Gallo MCDUFFIE sent at 11/8/2024 10:19 AM EST -----  Regarding: care gap request  11/08/24 10:19 AM  11/08/24 10:19 AM    Hello, our patient Kamari Spence has had Hepatitis C completed/performed. Please assist in updating the patient chart by pulling the Care Everywhere (CE) document. The date of service is 12/16/2016.     Thank you,  Gallo Porter MA  [facility]

## 2024-11-11 ENCOUNTER — OFFICE VISIT (OUTPATIENT)
Dept: FAMILY MEDICINE CLINIC | Facility: CLINIC | Age: 79
End: 2024-11-11
Payer: MEDICARE

## 2024-11-11 VITALS
WEIGHT: 174.2 LBS | BODY MASS INDEX: 26.4 KG/M2 | HEIGHT: 68 IN | HEART RATE: 57 BPM | OXYGEN SATURATION: 98 % | TEMPERATURE: 97.1 F | SYSTOLIC BLOOD PRESSURE: 160 MMHG | DIASTOLIC BLOOD PRESSURE: 70 MMHG

## 2024-11-11 DIAGNOSIS — Z00.00 MEDICARE ANNUAL WELLNESS VISIT, SUBSEQUENT: Primary | ICD-10-CM

## 2024-11-11 DIAGNOSIS — I10 ESSENTIAL HYPERTENSION: ICD-10-CM

## 2024-11-11 DIAGNOSIS — N18.4 CHRONIC KIDNEY DISEASE, STAGE 4 (SEVERE) (HCC): ICD-10-CM

## 2024-11-11 DIAGNOSIS — Z23 NEEDS FLU SHOT: ICD-10-CM

## 2024-11-11 DIAGNOSIS — Z23 NEED FOR COVID-19 VACCINE: ICD-10-CM

## 2024-11-11 DIAGNOSIS — N18.31 TYPE 2 DIABETES MELLITUS WITH STAGE 3A CHRONIC KIDNEY DISEASE, WITHOUT LONG-TERM CURRENT USE OF INSULIN (HCC): ICD-10-CM

## 2024-11-11 DIAGNOSIS — E11.22 TYPE 2 DIABETES MELLITUS WITH STAGE 3A CHRONIC KIDNEY DISEASE, WITHOUT LONG-TERM CURRENT USE OF INSULIN (HCC): ICD-10-CM

## 2024-11-11 LAB — SL AMB POCT HEMOGLOBIN AIC: 7.2 (ref ?–6.5)

## 2024-11-11 PROCEDURE — 91320 SARSCV2 VAC 30MCG TRS-SUC IM: CPT | Performed by: FAMILY MEDICINE

## 2024-11-11 PROCEDURE — 83036 HEMOGLOBIN GLYCOSYLATED A1C: CPT | Performed by: FAMILY MEDICINE

## 2024-11-11 PROCEDURE — G0439 PPPS, SUBSEQ VISIT: HCPCS | Performed by: FAMILY MEDICINE

## 2024-11-11 PROCEDURE — 90662 IIV NO PRSV INCREASED AG IM: CPT | Performed by: FAMILY MEDICINE

## 2024-11-11 PROCEDURE — G0008 ADMIN INFLUENZA VIRUS VAC: HCPCS | Performed by: FAMILY MEDICINE

## 2024-11-11 PROCEDURE — 99214 OFFICE O/P EST MOD 30 MIN: CPT | Performed by: FAMILY MEDICINE

## 2024-11-11 PROCEDURE — 90480 ADMN SARSCOV2 VAC 1/ONLY CMP: CPT | Performed by: FAMILY MEDICINE

## 2024-11-11 RX ORDER — OLMESARTAN MEDOXOMIL 40 MG/1
40 TABLET ORAL DAILY
Qty: 100 TABLET | Refills: 3 | Status: SHIPPED | OUTPATIENT
Start: 2024-11-11

## 2024-11-11 NOTE — PATIENT INSTRUCTIONS
Medicare wellness exam is completed.  Diabetes close to control with an A1c of 7.2.  Blood pressure much too high.  Increase olmesartan to 40 mg daily.  Prescription given for lipid profile to be done with next blood draw for Dr. Ly.  Flu and COVID given in the office.  Recommend Shingrix at local drugstore which is 2 shots  by 2-6 months.  Also recommend RSV which is a one-time vaccine for respiratory syncytial virus.  Recheck in 3 months.    Medicare Preventive Visit Patient Instructions  Thank you for completing your Welcome to Medicare Visit or Medicare Annual Wellness Visit today. Your next wellness visit will be due in one year (11/12/2025).  The screening/preventive services that you may require over the next 5-10 years are detailed below. Some tests may not apply to you based off risk factors and/or age. Screening tests ordered at today's visit but not completed yet may show as past due. Also, please note that scanned in results may not display below.  Preventive Screenings:  Service Recommendations Previous Testing/Comments   Colorectal Cancer Screening  Colonoscopy    Fecal Occult Blood Test (FOBT)/Fecal Immunochemical Test (FIT)  Fecal DNA/Cologuard Test  Flexible Sigmoidoscopy Age: 45-75 years old   Colonoscopy: every 10 years (May be performed more frequently if at higher risk)  OR  FOBT/FIT: every 1 year  OR  Cologuard: every 3 years  OR  Sigmoidoscopy: every 5 years  Screening may be recommended earlier than age 45 if at higher risk for colorectal cancer. Also, an individualized decision between you and your healthcare provider will decide whether screening between the ages of 76-85 would be appropriate. Colonoscopy: Not on file  FOBT/FIT: Not on file  Cologuard: 05/20/2023  Sigmoidoscopy: Not on file          Prostate Cancer Screening Individualized decision between patient and health care provider in men between ages of 55-69   Medicare will cover every 12 months beginning on the day  after your 50th birthday PSA: No results in last 5 years           Hepatitis C Screening Once for adults born between 1945 and 1965  More frequently in patients at high risk for Hepatitis C Hep C Antibody: 12/16/2016        Diabetes Screening 1-2 times per year if you're at risk for diabetes or have pre-diabetes Fasting glucose: 162 mg/dL (5/23/2023)  A1C: 7.1 % (7/10/2024)      Cholesterol Screening Once every 5 years if you don't have a lipid disorder. May order more often based on risk factors. Lipid panel: 04/18/2023         Other Preventive Screenings Covered by Medicare:  Abdominal Aortic Aneurysm (AAA) Screening: covered once if your at risk. You're considered to be at risk if you have a family history of AAA or a male between the age of 65-75 who smoking at least 100 cigarettes in your lifetime.  Lung Cancer Screening: covers low dose CT scan once per year if you meet all of the following conditions: (1) Age 55-77; (2) No signs or symptoms of lung cancer; (3) Current smoker or have quit smoking within the last 15 years; (4) You have a tobacco smoking history of at least 20 pack years (packs per day x number of years you smoked); (5) You get a written order from a healthcare provider.  Glaucoma Screening: covered annually if you're considered high risk: (1) You have diabetes OR (2) Family history of glaucoma OR (3)  aged 50 and older OR (4)  American aged 65 and older  Osteoporosis Screening: covered every 2 years if you meet one of the following conditions: (1) Have a vertebral abnormality; (2) On glucocorticoid therapy for more than 3 months; (3) Have primary hyperparathyroidism; (4) On osteoporosis medications and need to assess response to drug therapy.  HIV Screening: covered annually if you're between the age of 15-65. Also covered annually if you are younger than 15 and older than 65 with risk factors for HIV infection. For pregnant patients, it is covered up to 3 times per  pregnancy.    Immunizations:  Immunization Recommendations   Influenza Vaccine Annual influenza vaccination during flu season is recommended for all persons aged >= 6 months who do not have contraindications   Pneumococcal Vaccine   * Pneumococcal conjugate vaccine = PCV13 (Prevnar 13), PCV15 (Vaxneuvance), PCV20 (Prevnar 20)  * Pneumococcal polysaccharide vaccine = PPSV23 (Pneumovax) Adults 19-65 yo with certain risk factors or if 65+ yo  If never received any pneumonia vaccine: recommend Prevnar 20 (PCV20)  Give PCV20 if previously received 1 dose of PCV13 or PPSV23   Hepatitis B Vaccine 3 dose series if at intermediate or high risk (ex: diabetes, end stage renal disease, liver disease)   Respiratory syncytial virus (RSV) Vaccine - COVERED BY MEDICARE PART D  * RSVPreF3 (Arexvy) CDC recommends that adults 60 years of age and older may receive a single dose of RSV vaccine using shared clinical decision-making (SCDM)   Tetanus (Td) Vaccine - COST NOT COVERED BY MEDICARE PART B Following completion of primary series, a booster dose should be given every 10 years to maintain immunity against tetanus. Td may also be given as tetanus wound prophylaxis.   Tdap Vaccine - COST NOT COVERED BY MEDICARE PART B Recommended at least once for all adults. For pregnant patients, recommended with each pregnancy.   Shingles Vaccine (Shingrix) - COST NOT COVERED BY MEDICARE PART B  2 shot series recommended in those 19 years and older who have or will have weakened immune systems or those 50 years and older     Health Maintenance Due:      Topic Date Due    Hepatitis C Screening  Completed    Colorectal Cancer Screening  Discontinued     Immunizations Due:      Topic Date Due    Influenza Vaccine (1) 09/01/2024    COVID-19 Vaccine (7 - 2023-24 season) 09/01/2024     Advance Directives   What are advance directives?  Advance directives are legal documents that state your wishes and plans for medical care. These plans are made ahead of  time in case you lose your ability to make decisions for yourself. Advance directives can apply to any medical decision, such as the treatments you want, and if you want to donate organs.   What are the types of advance directives?  There are many types of advance directives, and each state has rules about how to use them. You may choose a combination of any of the following:  Living will:  This is a written record of the treatment you want. You can also choose which treatments you do not want, which to limit, and which to stop at a certain time. This includes surgery, medicine, IV fluid, and tube feedings.   Durable power of  for healthcare (DPAHC):  This is a written record that states who you want to make healthcare choices for you when you are unable to make them for yourself. This person, called a proxy, is usually a family member or a friend. You may choose more than 1 proxy.  Do not resuscitate (DNR) order:  A DNR order is used in case your heart stops beating or you stop breathing. It is a request not to have certain forms of treatment, such as CPR. A DNR order may be included in other types of advance directives.  Medical directive:  This covers the care that you want if you are in a coma, near death, or unable to make decisions for yourself. You can list the treatments you want for each condition. Treatment may include pain medicine, surgery, blood transfusions, dialysis, IV or tube feedings, and a ventilator (breathing machine).  Values history:  This document has questions about your views, beliefs, and how you feel and think about life. This information can help others choose the care that you would choose.  Why are advance directives important?  An advance directive helps you control your care. Although spoken wishes may be used, it is better to have your wishes written down. Spoken wishes can be misunderstood, or not followed. Treatments may be given even if you do not want them. An advance  directive may make it easier for your family to make difficult choices about your care.   Weight Management   Why it is important to manage your weight:  Being overweight increases your risk of health conditions such as heart disease, high blood pressure, type 2 diabetes, and certain types of cancer. It can also increase your risk for osteoarthritis, sleep apnea, and other respiratory problems. Aim for a slow, steady weight loss. Even a small amount of weight loss can lower your risk of health problems.  How to lose weight safely:  A safe and healthy way to lose weight is to eat fewer calories and get regular exercise. You can lose up about 1 pound a week by decreasing the number of calories you eat by 500 calories each day.   Healthy meal plan for weight management:  A healthy meal plan includes a variety of foods, contains fewer calories, and helps you stay healthy. A healthy meal plan includes the following:  Eat whole-grain foods more often.  A healthy meal plan should contain fiber. Fiber is the part of grains, fruits, and vegetables that is not broken down by your body. Whole-grain foods are healthy and provide extra fiber in your diet. Some examples of whole-grain foods are whole-wheat breads and pastas, oatmeal, brown rice, and bulgur.  Eat a variety of vegetables every day.  Include dark, leafy greens such as spinach, kale, kody greens, and mustard greens. Eat yellow and orange vegetables such as carrots, sweet potatoes, and winter squash.   Eat a variety of fruits every day.  Choose fresh or canned fruit (canned in its own juice or light syrup) instead of juice. Fruit juice has very little or no fiber.  Eat low-fat dairy foods.  Drink fat-free (skim) milk or 1% milk. Eat fat-free yogurt and low-fat cottage cheese. Try low-fat cheeses such as mozzarella and other reduced-fat cheeses.  Choose meat and other protein foods that are low in fat.  Choose beans or other legumes such as split peas or lentils.  Choose fish, skinless poultry (chicken or turkey), or lean cuts of red meat (beef or pork). Before you cook meat or poultry, cut off any visible fat.   Use less fat and oil.  Try baking foods instead of frying them. Add less fat, such as margarine, sour cream, regular salad dressing and mayonnaise to foods. Eat fewer high-fat foods. Some examples of high-fat foods include french fries, doughnuts, ice cream, and cakes.  Eat fewer sweets.  Limit foods and drinks that are high in sugar. This includes candy, cookies, regular soda, and sweetened drinks.  Exercise:  Exercise at least 30 minutes per day on most days of the week. Some examples of exercise include walking, biking, dancing, and swimming. You can also fit in more physical activity by taking the stairs instead of the elevator or parking farther away from stores. Ask your healthcare provider about the best exercise plan for you.      © Copyright [employer] 2018 Information is for End User's use only and may not be sold, redistributed or otherwise used for commercial purposes. All illustrations and images included in CareNotes® are the copyrighted property of [employer] or [employer]

## 2024-11-11 NOTE — PROGRESS NOTES
"Chief Complaint   Patient presents with    Medicare Wellness Visit    Follow-up        HPI   Here for follow-up of diabetes, chronic kidney disease, hypertension and PMR.  And Medicare wellness exam.  Doing well.  Seen at the gym exercising.  Continues on his usual medications.  Recent blood work from nephrology.  Kidney function stable.  Blood count normal.    Past Medical History:   Diagnosis Date    CKD (chronic kidney disease) 2021    Diabetes type 2, controlled (Formerly [facility]) 2002    Hypertension 1984    Kidney stone 1980    did have this removed    PMR (polymyalgia rheumatica) (Formerly [facility]) 7/14/2023    Diagnosed 6/23 with elevated sed rate and CRP    Rheumatoid arthritis involving multiple sites ([facility]ly [facility]) 7/14/2023    Positive rheumatoid factor 40    Rheumatoid factor positive 7/14/2023        History reviewed. No pertinent surgical history.    Social History     Tobacco Use    Smoking status: Never    Smokeless tobacco: Never   Substance Use Topics    Alcohol use: Not Currently       Social History     Social History Narrative     since 1968.    2 children.  7 grandchildren.  3 live nearby, 4 live in [place].    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    Enjoys walking, visiting friends and grandkids. 25-30 miles a week.         The following portions of the patient's history were reviewed and updated as appropriate: allergies, current medications, past family history, past medical history, past social history, past surgical history, and problem list.      Review of Systems       /70 (BP Location: Left arm, Patient Position: Sitting, Cuff Size: Adult)   Pulse 57   Temp (!) 97.1 °F (36.2 °C) (Temporal)   Ht 5' 8\" (1.727 m)   Wt 79 kg (174 lb 3.2 oz)   SpO2 98%   BMI 26.49 kg/m²      Physical Exam  Cardiovascular:      Pulses: no weak pulses.           Dorsalis pedis pulses are 2+ on the right side and 2+ on the left side.   Feet:      Right foot:      Skin integrity: No ulcer, " skin breakdown, erythema, warmth, callus or dry skin.      Left foot:      Skin integrity: No ulcer, skin breakdown, erythema, warmth, callus or dry skin.        Repeat blood pressure 176/70.  Appears well.  Lungs are clear.  Heart regular.  Mood is upbeat.  Affect appropriate.  A1c 7.2.    Patient's shoes and socks removed.    Right Foot/Ankle   Right Foot Inspection  Skin Exam: skin normal and skin intact. No dry skin, no warmth, no callus, no erythema, no maceration, no abnormal color, no pre-ulcer, no ulcer and no callus.     Toe Exam: ROM and strength within normal limits.     Sensory   Monofilament testing: intact    Vascular  The right DP pulse is 2+.     Left Foot/Ankle  Left Foot Inspection  Skin Exam: skin normal and skin intact. No dry skin, no warmth, no erythema, no maceration, normal color, no pre-ulcer, no ulcer and no callus.     Toe Exam: ROM and strength within normal limits.     Sensory   Monofilament testing: intact    Vascular  The left DP pulse is 2+.     Assign Risk Category  No deformity present  No loss of protective sensation  No weak pulses  Risk: 0                 Current Outpatient Medications:     Empagliflozin 25 MG TABS, Take 1 tablet (25 mg total) by mouth daily, Disp: 90 tablet, Rfl: 3    glipiZIDE (GLUCOTROL XL) 10 mg 24 hr tablet, TAKE 2 TABLETS DAILY IN THE MORNING, Disp: 180 tablet, Rfl: 1    glucose blood test strip, Test dasily, Disp: 100 strip, Rfl: 3    metFORMIN (GLUCOPHAGE-XR) 500 mg 24 hr tablet, Take 2 tablets (1,000 mg total) by mouth daily with breakfast, Disp: 180 tablet, Rfl: 3    NON FORMULARY, Take 750 mg by mouth in the morning CURCUMIN, Disp: , Rfl:     olmesartan (BENICAR) 40 mg tablet, Take 1 tablet (40 mg total) by mouth daily, Disp: 100 tablet, Rfl: 3    OneTouch Delica Lancets 30G MISC, Use 1 Lancet 4 (four) times a day, Disp: 200 each, Rfl: 1    simvastatin (ZOCOR) 20 mg tablet, Take 1 tablet (20 mg total) by mouth in the morning, Disp: 90 tablet, Rfl: 3     tamsulosin (FLOMAX) 0.4 mg, Take 1 capsule (0.4 mg total) by mouth daily with dinner, Disp: 90 capsule, Rfl: 3     No problem-specific Assessment & Plan notes found for this encounter.       Diagnoses and all orders for this visit:    Medicare annual wellness visit, subsequent    Essential hypertension  -     olmesartan (BENICAR) 40 mg tablet; Take 1 tablet (40 mg total) by mouth daily    Type 2 diabetes mellitus with stage 3a chronic kidney disease, without long-term current use of insulin (HCC)  -     POCT hemoglobin A1c  -     Lipid panel; Future    Chronic kidney disease, stage 4 (severe) (HCC)    Need for COVID-19 vaccine  -     COVID-19 Pfizer mRNA vaccine 12 yr and older (Comirnaty pre-filled syringe)    Needs flu shot  -     influenza vaccine, high-dose, PF 0.5 mL (Fluzone High Dose)        Patient Instructions   Medicare wellness exam is completed.  Diabetes close to control with an A1c of 7.2.  Blood pressure much too high.  Increase olmesartan to 40 mg daily.  Prescription given for lipid profile to be done with next blood draw for Dr. Ly.  Flu and COVID given in the office.  Recommend Shingrix at local drugstore which is 2 shots  by 2-6 months.  Also recommend RSV which is a one-time vaccine for respiratory syncytial virus.  Recheck in 3 months.    Medicare Preventive Visit Patient Instructions  Thank you for completing your Welcome to Medicare Visit or Medicare Annual Wellness Visit today. Your next wellness visit will be due in one year (11/12/2025).  The screening/preventive services that you may require over the next 5-10 years are detailed below. Some tests may not apply to you based off risk factors and/or age. Screening tests ordered at today's visit but not completed yet may show as past due. Also, please note that scanned in results may not display below.  Preventive Screenings:  Service Recommendations Previous Testing/Comments   Colorectal Cancer Screening  Colonoscopy    Fecal  Occult Blood Test (FOBT)/Fecal Immunochemical Test (FIT)  Fecal DNA/Cologuard Test  Flexible Sigmoidoscopy Age: 45-75 years old   Colonoscopy: every 10 years (May be performed more frequently if at higher risk)  OR  FOBT/FIT: every 1 year  OR  Cologuard: every 3 years  OR  Sigmoidoscopy: every 5 years  Screening may be recommended earlier than age 45 if at higher risk for colorectal cancer. Also, an individualized decision between you and your healthcare provider will decide whether screening between the ages of 76-85 would be appropriate. Colonoscopy: Not on file  FOBT/FIT: Not on file  Cologuard: 05/20/2023  Sigmoidoscopy: Not on file          Prostate Cancer Screening Individualized decision between patient and health care provider in men between ages of 55-69   Medicare will cover every 12 months beginning on the day after your 50th birthday PSA: No results in last 5 years           Hepatitis C Screening Once for adults born between 1945 and 1965  More frequently in patients at high risk for Hepatitis C Hep C Antibody: 12/16/2016        Diabetes Screening 1-2 times per year if you're at risk for diabetes or have pre-diabetes Fasting glucose: 162 mg/dL (5/23/2023)  A1C: 7.1 % (7/10/2024)      Cholesterol Screening Once every 5 years if you don't have a lipid disorder. May order more often based on risk factors. Lipid panel: 04/18/2023         Other Preventive Screenings Covered by Medicare:  Abdominal Aortic Aneurysm (AAA) Screening: covered once if your at risk. You're considered to be at risk if you have a family history of AAA or a male between the age of 65-75 who smoking at least 100 cigarettes in your lifetime.  Lung Cancer Screening: covers low dose CT scan once per year if you meet all of the following conditions: (1) Age 55-77; (2) No signs or symptoms of lung cancer; (3) Current smoker or have quit smoking within the last 15 years; (4) You have a tobacco smoking history of at least 20 pack years (packs  per day x number of years you smoked); (5) You get a written order from a healthcare provider.  Glaucoma Screening: covered annually if you're considered high risk: (1) You have diabetes OR (2) Family history of glaucoma OR (3)  aged 50 and older OR (4)  American aged 65 and older  Osteoporosis Screening: covered every 2 years if you meet one of the following conditions: (1) Have a vertebral abnormality; (2) On glucocorticoid therapy for more than 3 months; (3) Have primary hyperparathyroidism; (4) On osteoporosis medications and need to assess response to drug therapy.  HIV Screening: covered annually if you're between the age of 15-65. Also covered annually if you are younger than 15 and older than 65 with risk factors for HIV infection. For pregnant patients, it is covered up to 3 times per pregnancy.    Immunizations:  Immunization Recommendations   Influenza Vaccine Annual influenza vaccination during flu season is recommended for all persons aged >= 6 months who do not have contraindications   Pneumococcal Vaccine   * Pneumococcal conjugate vaccine = PCV13 (Prevnar 13), PCV15 (Vaxneuvance), PCV20 (Prevnar 20)  * Pneumococcal polysaccharide vaccine = PPSV23 (Pneumovax) Adults 19-63 yo with certain risk factors or if 65+ yo  If never received any pneumonia vaccine: recommend Prevnar 20 (PCV20)  Give PCV20 if previously received 1 dose of PCV13 or PPSV23   Hepatitis B Vaccine 3 dose series if at intermediate or high risk (ex: diabetes, end stage renal disease, liver disease)   Respiratory syncytial virus (RSV) Vaccine - COVERED BY MEDICARE PART D  * RSVPreF3 (Arexvy) CDC recommends that adults 60 years of age and older may receive a single dose of RSV vaccine using shared clinical decision-making (SCDM)   Tetanus (Td) Vaccine - COST NOT COVERED BY MEDICARE PART B Following completion of primary series, a booster dose should be given every 10 years to maintain immunity against tetanus. Td  may also be given as tetanus wound prophylaxis.   Tdap Vaccine - COST NOT COVERED BY MEDICARE PART B Recommended at least once for all adults. For pregnant patients, recommended with each pregnancy.   Shingles Vaccine (Shingrix) - COST NOT COVERED BY MEDICARE PART B  2 shot series recommended in those 19 years and older who have or will have weakened immune systems or those 50 years and older     Health Maintenance Due:      Topic Date Due    Hepatitis C Screening  Completed    Colorectal Cancer Screening  Discontinued     Immunizations Due:      Topic Date Due    Influenza Vaccine (1) 09/01/2024    COVID-19 Vaccine (7 - 2023-24 season) 09/01/2024     Advance Directives   What are advance directives?  Advance directives are legal documents that state your wishes and plans for medical care. These plans are made ahead of time in case you lose your ability to make decisions for yourself. Advance directives can apply to any medical decision, such as the treatments you want, and if you want to donate organs.   What are the types of advance directives?  There are many types of advance directives, and each state has rules about how to use them. You may choose a combination of any of the following:  Living will:  This is a written record of the treatment you want. You can also choose which treatments you do not want, which to limit, and which to stop at a certain time. This includes surgery, medicine, IV fluid, and tube feedings.   Durable power of  for healthcare (DPAHC):  This is a written record that states who you want to make healthcare choices for you when you are unable to make them for yourself. This person, called a proxy, is usually a family member or a friend. You may choose more than 1 proxy.  Do not resuscitate (DNR) order:  A DNR order is used in case your heart stops beating or you stop breathing. It is a request not to have certain forms of treatment, such as CPR. A DNR order may be included in other  types of advance directives.  Medical directive:  This covers the care that you want if you are in a coma, near death, or unable to make decisions for yourself. You can list the treatments you want for each condition. Treatment may include pain medicine, surgery, blood transfusions, dialysis, IV or tube feedings, and a ventilator (breathing machine).  Values history:  This document has questions about your views, beliefs, and how you feel and think about life. This information can help others choose the care that you would choose.  Why are advance directives important?  An advance directive helps you control your care. Although spoken wishes may be used, it is better to have your wishes written down. Spoken wishes can be misunderstood, or not followed. Treatments may be given even if you do not want them. An advance directive may make it easier for your family to make difficult choices about your care.   Weight Management   Why it is important to manage your weight:  Being overweight increases your risk of health conditions such as heart disease, high blood pressure, type 2 diabetes, and certain types of cancer. It can also increase your risk for osteoarthritis, sleep apnea, and other respiratory problems. Aim for a slow, steady weight loss. Even a small amount of weight loss can lower your risk of health problems.  How to lose weight safely:  A safe and healthy way to lose weight is to eat fewer calories and get regular exercise. You can lose up about 1 pound a week by decreasing the number of calories you eat by 500 calories each day.   Healthy meal plan for weight management:  A healthy meal plan includes a variety of foods, contains fewer calories, and helps you stay healthy. A healthy meal plan includes the following:  Eat whole-grain foods more often.  A healthy meal plan should contain fiber. Fiber is the part of grains, fruits, and vegetables that is not broken down by your body. Whole-grain foods are  healthy and provide extra fiber in your diet. Some examples of whole-grain foods are whole-wheat breads and pastas, oatmeal, brown rice, and bulgur.  Eat a variety of vegetables every day.  Include dark, leafy greens such as spinach, kale, kody greens, and mustard greens. Eat yellow and orange vegetables such as carrots, sweet potatoes, and winter squash.   Eat a variety of fruits every day.  Choose fresh or canned fruit (canned in its own juice or light syrup) instead of juice. Fruit juice has very little or no fiber.  Eat low-fat dairy foods.  Drink fat-free (skim) milk or 1% milk. Eat fat-free yogurt and low-fat cottage cheese. Try low-fat cheeses such as mozzarella and other reduced-fat cheeses.  Choose meat and other protein foods that are low in fat.  Choose beans or other legumes such as split peas or lentils. Choose fish, skinless poultry (chicken or turkey), or lean cuts of red meat (beef or pork). Before you cook meat or poultry, cut off any visible fat.   Use less fat and oil.  Try baking foods instead of frying them. Add less fat, such as margarine, sour cream, regular salad dressing and mayonnaise to foods. Eat fewer high-fat foods. Some examples of high-fat foods include french fries, doughnuts, ice cream, and cakes.  Eat fewer sweets.  Limit foods and drinks that are high in sugar. This includes candy, cookies, regular soda, and sweetened drinks.  Exercise:  Exercise at least 30 minutes per day on most days of the week. Some examples of exercise include walking, biking, dancing, and swimming. You can also fit in more physical activity by taking the stairs instead of the elevator or parking farther away from stores. Ask your healthcare provider about the best exercise plan for you.      © Copyright [employer] 2018 Information is for End User's use only and may not be sold, redistributed or otherwise used for commercial purposes. All illustrations and images included in CareNotes® are the  copyrighted property of [email or web address] or [employer]

## 2024-11-11 NOTE — PROGRESS NOTES
Ambulatory Visit  Name: Kamari Spence      : 1945      MRN: [id number]  Encounter Provider: Kei Morgan MD  Encounter Date: 2024   Encounter department: [facility] [place] [facility]    Assessment & Plan  Medicare annual wellness visit, subsequent            Preventive health issues were discussed with patient, and age appropriate screening tests were ordered as noted in patient's After Visit Summary. Personalized health advice and appropriate referrals for health education or preventive services given if needed, as noted in patient's After Visit Summary.    History of Present Illness     HPI   Patient Care Team:  Kei Morgan MD as PCP - General (Family Medicine)    Review of Systems  Medical History Reviewed by provider this encounter:       Annual Wellness Visit Questionnaire   Kamari is here for his Subsequent Wellness visit.     Health Risk Assessment:   Patient rates overall health as good. Patient feels that their physical health rating is much better. Patient is very satisfied with their life. Eyesight was rated as same. Hearing was rated as same. Patient feels that their emotional and mental health rating is much better. Patients states they are never, rarely angry. Patient states they are never, rarely unusually tired/fatigued. Pain experienced in the last 7 days has been none. Patient states that he has experienced no weight loss or gain in last 6 months.     Depression Screening:   PHQ-2 Score: 0      Fall Risk Screening:   In the past year, patient has experienced: no history of falling in past year      Home Safety:  Patient does not have trouble with stairs inside or outside of their home. Patient has working smoke alarms and has working carbon monoxide detector. Home safety hazards include: none.     Nutrition:   Current diet is Regular. Low salt and less sugar, kidney friendly diet    Medications:   Patient is currently taking over-the-counter supplements. OTC medications  "include: see medication list. Patient is able to manage medications.     Activities of Daily Living (ADLs)/Instrumental Activities of Daily Living (IADLs):   Walk and transfer into and out of bed and chair?: Yes  Dress and groom yourself?: Yes    Bathe or shower yourself?: Yes    Feed yourself? Yes  Do your laundry/housekeeping?: Yes  Manage your money, pay your bills and track your expenses?: Yes  Make your own meals?: Yes      Previous Hospitalizations:   Any hospitalizations or ED visits within the last 12 months?: No      Advance Care Planning:   Living will: No      PREVENTIVE SCREENINGS      Cardiovascular Screening:    General: Screening Not Indicated and History Lipid Disorder      Diabetes Screening:     General: Screening Not Indicated and History Diabetes      Prostate Cancer Screening:    General: Screening Not Indicated      Lung Cancer Screening:     General: Screening Not Indicated      Hepatitis C Screening:    General: Screening Current    Screening, Brief Intervention, and Referral to Treatment (SBIRT)    Screening    Typical number of drinks in a week: 0    Single Item Drug Screening:  How often have you used an illegal drug (including marijuana) or a prescription medication for non-medical reasons in the past year? never    Single Item Drug Screen Score: 0  Interpretation: Negative screen for possible drug use disorder    Social Determinants of Health     Financial Resource Strain: Low Risk  (8/21/2023)    Overall Financial Resource Strain (CARDIA)     Difficulty of Paying Living Expenses: Not hard at all   Transportation Needs: No Transportation Needs (8/21/2023)    PRAPARE - Transportation     Lack of Transportation (Medical): No     Lack of Transportation (Non-Medical): No     No results found.    Objective     /70 (BP Location: Left arm, Patient Position: Sitting, Cuff Size: Adult)   Pulse 57   Temp (!) 97.1 °F (36.2 °C) (Temporal)   Ht 5' 8\" (1.727 m)   Wt 79 kg (174 lb 3.2 oz)   " SpO2 98%   BMI 26.49 kg/m²     Physical Exam

## 2024-12-03 ENCOUNTER — TELEPHONE (OUTPATIENT)
Age: 79
End: 2024-12-03

## 2024-12-03 NOTE — TELEPHONE ENCOUNTER
Gretchen with [facility] called. Has not received signed Statement of Treating Physician for glucose monitoring test strips, for medicare. Document found in EMR, was signed on 11/6 and faxed on 11/7. Pharmacy requesting the form be re faxed to them again at [phone].     Fax sent.

## 2024-12-12 ENCOUNTER — TELEPHONE (OUTPATIENT)
Age: 79
End: 2024-12-12

## 2024-12-12 ENCOUNTER — NURSE TRIAGE (OUTPATIENT)
Age: 79
End: 2024-12-12

## 2024-12-12 ENCOUNTER — OFFICE VISIT (OUTPATIENT)
Dept: FAMILY MEDICINE CLINIC | Facility: CLINIC | Age: 79
End: 2024-12-12
Payer: MEDICARE

## 2024-12-12 VITALS
SYSTOLIC BLOOD PRESSURE: 142 MMHG | DIASTOLIC BLOOD PRESSURE: 64 MMHG | HEART RATE: 67 BPM | BODY MASS INDEX: 26.25 KG/M2 | WEIGHT: 173.2 LBS | OXYGEN SATURATION: 96 % | HEIGHT: 68 IN

## 2024-12-12 DIAGNOSIS — I10 ESSENTIAL HYPERTENSION: Primary | ICD-10-CM

## 2024-12-12 PROCEDURE — 99213 OFFICE O/P EST LOW 20 MIN: CPT | Performed by: FAMILY MEDICINE

## 2024-12-12 PROCEDURE — G2211 COMPLEX E/M VISIT ADD ON: HCPCS | Performed by: FAMILY MEDICINE

## 2024-12-12 RX ORDER — HYDROCHLOROTHIAZIDE 12.5 MG/1
12.5 TABLET ORAL DAILY
Qty: 30 TABLET | Refills: 5 | Status: SHIPPED | OUTPATIENT
Start: 2024-12-12 | End: 2025-06-10

## 2024-12-12 NOTE — ASSESSMENT & PLAN NOTE
- Blood pressure not at goal; continue Olmesartan 40mg daily and start hydrochlorothiazide 12.5mg daily. Follow up in 3 weeks for a blood pressure check.   Orders:    hydroCHLOROthiazide 12.5 mg tablet; Take 1 tablet (12.5 mg total) by mouth daily

## 2024-12-12 NOTE — TELEPHONE ENCOUNTER
"Received call from patient with concerns about elevated blood pressure and pulse.  Readings from the past 3 days as follows:    12/10: 174/62 p58 sitting  146/64 p64 standing  142/62 p62 sitting  117/54 p67 standing    12/11: 153/79 p62 sitting  133/71 p64 standing    12/12: 167/68 p78 sitting  154/68 p81 standing    Patient is mostly asymptomatic, only complaint is of feeling tired but admits that he did not sleep well last night.  He is mostly concerned about his pulse going up to the 80s because this is unusual for him.  He is requesting an appointment to discuss with provider.  Scheduled for appointment today at 2:00 pm with Dr. Mcnair since PCP not available today.      Reason for Disposition   Patient wants to be seen    Answer Assessment - Initial Assessment Questions  1. BLOOD PRESSURE: \"What is the blood pressure?\" \"Did you take at least two measurements 5 minutes apart?\"      167/68 sitting, 154/68 standing  2. ONSET: \"When did you take your blood pressure?\"      Takes at various times.  3. HOW: \"How did you obtain the blood pressure?\" (e.g., visiting nurse, automatic home BP monitor)      Automatic home cuff  4. HISTORY: \"Do you have a history of high blood pressure?\"      Yes  5. MEDICATIONS: \"Are you taking any medications for blood pressure?\" \"Have you missed any doses recently?\"      Benicar 40 mg  6. OTHER SYMPTOMS: \"Do you have any symptoms?\" (e.g., headache, chest pain, blurred vision, difficulty breathing, weakness)      Tired, did not sleep well last night.   7. PREGNANCY: \"Is there any chance you are pregnant?\" \"When was your last menstrual period?\"      N/A    Protocols used: High Blood Pressure-ADULT-[place]    "

## 2024-12-12 NOTE — PROGRESS NOTES
"Name: Kamari Specne      : 1945      MRN: [id number]  Encounter Provider: Dunia Mcnair MD  Encounter Date: 2024   Encounter department: [facility]  :  Assessment & Plan  Essential hypertension  - Blood pressure not at goal; continue Olmesartan 40mg daily and start hydrochlorothiazide 12.5mg daily. Follow up in 3 weeks for a blood pressure check.   Orders:    hydroCHLOROthiazide 12.5 mg tablet; Take 1 tablet (12.5 mg total) by mouth daily              Kamari Spence is a pleasant 79 year old male with a past medical history of hypertension and CKD who presents today with concerns regarding his blood pressure. At previous office visit his Olmesartan was increased to 40mg after his blood pressure was noted to be elevated at 160/70. Since that time he has been monitoring his blood pressure which have typically been in the 140's - 150's systolic.     Review of Systems   Constitutional: Negative.    HENT: Negative.     Eyes: Negative.    Respiratory: Negative.     Cardiovascular: Negative.    Gastrointestinal: Negative.    Musculoskeletal: Negative.    Skin: Negative.    Neurological: Negative.    Psychiatric/Behavioral: Negative.         Objective   /64 (BP Location: Left arm, Patient Position: Sitting, Cuff Size: Large)   Pulse 67   Ht 5' 8\" (1.727 m)   Wt 78.6 kg (173 lb 3.2 oz)   SpO2 96%   BMI 26.33 kg/m²      Physical Exam  Constitutional:       General: He is not in acute distress.     Appearance: He is not ill-appearing.   HENT:      Head: Normocephalic and atraumatic.   Eyes:      General:         Right eye: No discharge.         Left eye: No discharge.      Extraocular Movements: Extraocular movements intact.   Cardiovascular:      Rate and Rhythm: Normal rate.   Pulmonary:      Effort: Pulmonary effort is normal. No respiratory distress.   Neurological:      Mental Status: He is alert.         "

## 2024-12-12 NOTE — TELEPHONE ENCOUNTER
Pt called with concerns of his bp and pulse readings. Warm transferred to nurse [facility].

## 2024-12-20 VITALS
OXYGEN SATURATION: 96 % | WEIGHT: 173.2 LBS | HEART RATE: 67 BPM | SYSTOLIC BLOOD PRESSURE: 138 MMHG | DIASTOLIC BLOOD PRESSURE: 64 MMHG | HEIGHT: 68 IN | BODY MASS INDEX: 26.25 KG/M2

## 2025-01-08 ENCOUNTER — TELEPHONE (OUTPATIENT)
Dept: FAMILY MEDICINE CLINIC | Facility: CLINIC | Age: 80
End: 2025-01-08

## 2025-01-08 ENCOUNTER — CLINICAL SUPPORT (OUTPATIENT)
Dept: FAMILY MEDICINE CLINIC | Facility: CLINIC | Age: 80
End: 2025-01-08

## 2025-01-08 VITALS — SYSTOLIC BLOOD PRESSURE: 142 MMHG | DIASTOLIC BLOOD PRESSURE: 60 MMHG

## 2025-01-08 DIAGNOSIS — I10 ESSENTIAL HYPERTENSION: Primary | ICD-10-CM

## 2025-01-08 PROCEDURE — NURSE

## 2025-01-08 NOTE — TELEPHONE ENCOUNTER
Patient came in for a nurse visit for his blood pressure and he takes the blood pressure at home also. He wanted to let you know that on 01/06/2025 he woke up and he fell like he had pain on his joints, his knees, his shoulders and his fingers he is not sure why it happened. He has PMR but he is not sure if this is related. He just wanted this to be on his record. Also would you advise for him to stay on the hydrochlorothiazide for his blood pressure.

## 2025-01-21 ENCOUNTER — RESULTS FOLLOW-UP (OUTPATIENT)
Dept: FAMILY MEDICINE CLINIC | Facility: CLINIC | Age: 80
End: 2025-01-21

## 2025-01-21 LAB
CHOLEST SERPL-MCNC: 163 MG/DL
CHOLEST/HDLC SERPL: 4.2 {RATIO}
HDLC SERPL-MCNC: 39 MG/DL (ref 23–92)
LDLC SERPL CALC-MCNC: 78 MG/DL
NONHDLC SERPL-MCNC: 124 MG/DL
TRIGL SERPL-MCNC: 229 MG/DL

## 2025-02-17 ENCOUNTER — OFFICE VISIT (OUTPATIENT)
Dept: FAMILY MEDICINE CLINIC | Facility: CLINIC | Age: 80
End: 2025-02-17
Payer: MEDICARE

## 2025-02-17 VITALS
OXYGEN SATURATION: 100 % | DIASTOLIC BLOOD PRESSURE: 67 MMHG | WEIGHT: 169.8 LBS | SYSTOLIC BLOOD PRESSURE: 121 MMHG | HEIGHT: 68 IN | HEART RATE: 65 BPM | BODY MASS INDEX: 25.73 KG/M2 | TEMPERATURE: 97.7 F

## 2025-02-17 DIAGNOSIS — N40.1 BENIGN PROSTATIC HYPERPLASIA WITH WEAK URINARY STREAM: ICD-10-CM

## 2025-02-17 DIAGNOSIS — N18.31 STAGE 3A CHRONIC KIDNEY DISEASE (HCC): ICD-10-CM

## 2025-02-17 DIAGNOSIS — N18.31 TYPE 2 DIABETES MELLITUS WITH STAGE 3A CHRONIC KIDNEY DISEASE, WITHOUT LONG-TERM CURRENT USE OF INSULIN (HCC): ICD-10-CM

## 2025-02-17 DIAGNOSIS — E78.2 MIXED HYPERLIPIDEMIA: ICD-10-CM

## 2025-02-17 DIAGNOSIS — R39.12 BENIGN PROSTATIC HYPERPLASIA WITH WEAK URINARY STREAM: ICD-10-CM

## 2025-02-17 DIAGNOSIS — E11.22 TYPE 2 DIABETES MELLITUS WITH STAGE 3A CHRONIC KIDNEY DISEASE, WITHOUT LONG-TERM CURRENT USE OF INSULIN (HCC): ICD-10-CM

## 2025-02-17 DIAGNOSIS — N18.4 CHRONIC KIDNEY DISEASE, STAGE 4 (SEVERE) (HCC): ICD-10-CM

## 2025-02-17 DIAGNOSIS — I10 ESSENTIAL HYPERTENSION: Primary | ICD-10-CM

## 2025-02-17 LAB — SL AMB POCT HEMOGLOBIN AIC: 7.9 (ref ?–6.5)

## 2025-02-17 PROCEDURE — 99214 OFFICE O/P EST MOD 30 MIN: CPT | Performed by: FAMILY MEDICINE

## 2025-02-17 PROCEDURE — G2211 COMPLEX E/M VISIT ADD ON: HCPCS | Performed by: FAMILY MEDICINE

## 2025-02-17 PROCEDURE — 83036 HEMOGLOBIN GLYCOSYLATED A1C: CPT | Performed by: FAMILY MEDICINE

## 2025-02-17 RX ORDER — SIMVASTATIN 20 MG
20 TABLET ORAL DAILY
Qty: 90 TABLET | Refills: 3 | Status: SHIPPED | OUTPATIENT
Start: 2025-02-17

## 2025-02-17 RX ORDER — GLIPIZIDE 10 MG/1
TABLET, FILM COATED, EXTENDED RELEASE ORAL
Qty: 180 TABLET | Refills: 3 | Status: SHIPPED | OUTPATIENT
Start: 2025-02-17

## 2025-02-17 RX ORDER — METFORMIN HYDROCHLORIDE 500 MG/1
1000 TABLET, EXTENDED RELEASE ORAL
Qty: 180 TABLET | Refills: 3 | Status: SHIPPED | OUTPATIENT
Start: 2025-02-17

## 2025-02-17 RX ORDER — TAMSULOSIN HYDROCHLORIDE 0.4 MG/1
0.4 CAPSULE ORAL
Qty: 90 CAPSULE | Refills: 3 | Status: SHIPPED | OUTPATIENT
Start: 2025-02-17

## 2025-02-17 NOTE — PATIENT INSTRUCTIONS
A1c up to 7.9.  Not interested in going back on Ozempic.  Another option would be pioglitazone which is an older medication.  For now, continue same medications.  He is aware that blood sugar control and blood pressure control will prevent deterioration of his kidney function.  Recheck in 3 months.

## 2025-02-17 NOTE — PROGRESS NOTES
Name: Kamari Spence      : 1945      MRN: [id number]  Encounter Provider: Kei Morgan MD  Encounter Date: 2025   Encounter department: [place] [facility]    Assessment & Plan  Essential hypertension         Type 2 diabetes mellitus with stage 3a chronic kidney disease, without long-term current use of insulin ([facility])    Lab Results   Component Value Date    HGBA1C 7.9 (A) 2025     Orders:  •  metFORMIN (GLUCOPHAGE-XR) 500 mg 24 hr tablet; Take 2 tablets (1,000 mg total) by mouth daily with breakfast  •  glipiZIDE (GLUCOTROL XL) 10 mg 24 hr tablet; TAKE 2 TABLETS DAILY IN THE MORNING  •  Empagliflozin 25 MG TABS; Take 1 tablet (25 mg total) by mouth daily  •  POCT hemoglobin A1c    Stage 3a chronic kidney disease (HCC)  Lab Results   Component Value Date    EGFR 36 (L) 2025    EGFR 40 (L) 10/17/2024    EGFR 35 (L) 07/10/2024    CREATININE 1.88 (H) 2025    CREATININE 1.71 (H) 10/17/2024    CREATININE 1.95 (H) 07/10/2024          Mixed hyperlipidemia    Orders:  •  simvastatin (ZOCOR) 20 mg tablet; Take 1 tablet (20 mg total) by mouth in the morning    Benign prostatic hyperplasia with weak urinary stream    Orders:  •  tamsulosin (FLOMAX) 0.4 mg; Take 1 capsule (0.4 mg total) by mouth daily with dinner    Chronic kidney disease, stage 4 (severe) ([facility])  Lab Results   Component Value Date    EGFR 36 (L) 2025    EGFR 40 (L) 10/17/2024    EGFR 35 (L) 07/10/2024    CREATININE 1.88 (H) 2025    CREATININE 1.71 (H) 10/17/2024    CREATININE 1.95 (H) 07/10/2024             Patient Instructions   A1c up to 7.9.  Not interested in going back on Ozempic.  Another option would be pioglitazone which is an older medication.  For now, continue same medications.  He is aware that blood sugar control and blood pressure control will prevent deterioration of his kidney function.  Recheck in 3 months.      History of Present Illness     HPI  Here for follow-up of diabetes, chronic  kidney disease, hypertension and PMR.   At last visit, olmesartan increased to 40 mg daily.  He has a nice log of blood pressure readings which are less than 140 systolic and less than 90 diastolic.  As a review, has been on Ozempic.  When off it for cataract surgery and did not restart.  In 4/24, A1c was 7.6.  Notes that he has been on Rybelsus but it was hard to take on an empty stomach.  Then had been on Ozempic.  Did have a little bit of GI upset at the high dose of 2 mg.    Review of Systems    Past Medical History:   Diagnosis Date   • CKD (chronic kidney disease) 2021   • Diabetes type 2, controlled ([facility]) 2002   • Hypertension 1984   • Kidney stone 1980    did have this removed   • PMR (polymyalgia rheumatica) ([facility]) 7/14/2023    Diagnosed 6/23 with elevated sed rate and CRP   • Rheumatoid arthritis involving multiple sites ([facility]) 7/14/2023    Positive rheumatoid factor 40   • Rheumatoid factor positive 7/14/2023     History reviewed. No pertinent surgical history.  Social History     Social History Narrative     since 1968.    2 children.  7 grandchildren.  3 live nearby, 4 live in [place].    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    Enjoys walking, visiting friends and grandkids. 25-30 miles a week.      Current Outpatient Medications on File Prior to Visit   Medication Sig   • glucose blood test strip Test dasily   • hydroCHLOROthiazide 12.5 mg tablet Take 1 tablet (12.5 mg total) by mouth daily   • NON FORMULARY Take 750 mg by mouth in the morning CURCUMIN   • olmesartan (BENICAR) 40 mg tablet Take 1 tablet (40 mg total) by mouth daily   • OneTouch Delica Lancets 30G MISC Use 1 Lancet 4 (four) times a day   • [DISCONTINUED] Empagliflozin 25 MG TABS Take 1 tablet (25 mg total) by mouth daily   • [DISCONTINUED] glipiZIDE (GLUCOTROL XL) 10 mg 24 hr tablet TAKE 2 TABLETS DAILY IN THE MORNING   • [DISCONTINUED] metFORMIN (GLUCOPHAGE-XR) 500 mg 24 hr tablet Take 2 tablets  "(1,000 mg total) by mouth daily with breakfast   • [DISCONTINUED] simvastatin (ZOCOR) 20 mg tablet Take 1 tablet (20 mg total) by mouth in the morning   • [DISCONTINUED] tamsulosin (FLOMAX) 0.4 mg Take 1 capsule (0.4 mg total) by mouth daily with dinner     No Known Allergies  Immunization History   Administered Date(s) Administered   • COVID-19 MODERNA VACC 0.5 ML IM 03/19/2021, 04/16/2021, 12/13/2021, 04/22/2022, 04/17/2023   • COVID-19 Moderna Vac BIVALENT 12 Yr+ IM 0.5 ML 04/17/2023   • COVID-19 Pfizer mRNA vacc PF bird-sucrose 12 yr and older (Comirnaty) 11/11/2024   • INFLUENZA 11/23/2010, 12/08/2011, 12/13/2012, 11/27/2013, 10/02/2014, 12/21/2016, 10/05/2017, 11/21/2018, 12/04/2019, 10/21/2020   • Influenza Split High Dose Preservative Free IM 01/06/2016, 12/21/2016, 10/05/2017, 11/21/2018, 12/04/2019, 11/11/2024   • Influenza, Seasonal Vaccine, Quadrivalent, Adjuvanted, .5e 10/21/2020   • Influenza, seasonal, injectable 10/20/2022   • Pneumococcal Conjugate 13-Valent 09/02/2015   • Pneumococcal Polysaccharide PPV23 11/27/2009, 03/12/2018   • Zoster 10/08/2012     Objective   /67 Comment: At home this morning  Pulse 65   Temp 97.7 °F (36.5 °C) (Temporal)   Ht 5' 8\" (1.727 m)   Wt 77 kg (169 lb 12.8 oz)   SpO2 100%   BMI 25.82 kg/m²     Physical Exam  Appears well.  Lungs are clear.  Heart regular.  Abdomen soft and nontender.  A1c 7.9.  On 1/21, creatinine 1.88 with GFR of 36.  Cholesterol 163 with HDL 39, LDL 78.  Triglycerides 229.      "

## 2025-02-17 NOTE — ASSESSMENT & PLAN NOTE
Orders:  •  tamsulosin (FLOMAX) 0.4 mg; Take 1 capsule (0.4 mg total) by mouth daily with dinner

## 2025-02-17 NOTE — ASSESSMENT & PLAN NOTE
Orders:  •  simvastatin (ZOCOR) 20 mg tablet; Take 1 tablet (20 mg total) by mouth in the morning

## 2025-02-17 NOTE — ASSESSMENT & PLAN NOTE
Lab Results   Component Value Date    HGBA1C 7.9 (A) 02/17/2025     Orders:  •  metFORMIN (GLUCOPHAGE-XR) 500 mg 24 hr tablet; Take 2 tablets (1,000 mg total) by mouth daily with breakfast  •  glipiZIDE (GLUCOTROL XL) 10 mg 24 hr tablet; TAKE 2 TABLETS DAILY IN THE MORNING  •  Empagliflozin 25 MG TABS; Take 1 tablet (25 mg total) by mouth daily  •  POCT hemoglobin A1c

## 2025-02-17 NOTE — ASSESSMENT & PLAN NOTE
Lab Results   Component Value Date    EGFR 36 (L) 01/21/2025    EGFR 40 (L) 10/17/2024    EGFR 35 (L) 07/10/2024    CREATININE 1.88 (H) 01/21/2025    CREATININE 1.71 (H) 10/17/2024    CREATININE 1.95 (H) 07/10/2024

## 2025-02-28 DIAGNOSIS — N18.31 TYPE 2 DIABETES MELLITUS WITH STAGE 3A CHRONIC KIDNEY DISEASE, WITHOUT LONG-TERM CURRENT USE OF INSULIN (HCC): ICD-10-CM

## 2025-02-28 DIAGNOSIS — E11.22 TYPE 2 DIABETES MELLITUS WITH STAGE 3A CHRONIC KIDNEY DISEASE, WITHOUT LONG-TERM CURRENT USE OF INSULIN (HCC): ICD-10-CM

## 2025-02-28 NOTE — TELEPHONE ENCOUNTER
Reason for call:   [x] Refill   [] Prior Auth  [] Other:     Office:   [x] PCP/Provider -  PRIMARY CARE [facility] POD  Authorized By: Kei Morgan MD    [] Specialty/Provider -     Medication: glucose blood test strip    Dose/Frequency:  4 X a day    Quantity: 100    Pharmacy: [id number] [place] [address]     Does the patient have enough for 3 days?   [x] Yes   [] No - Send as HP to POD

## 2025-05-05 DIAGNOSIS — N18.31 TYPE 2 DIABETES MELLITUS WITH STAGE 3A CHRONIC KIDNEY DISEASE, WITHOUT LONG-TERM CURRENT USE OF INSULIN (HCC): ICD-10-CM

## 2025-05-05 DIAGNOSIS — E11.22 TYPE 2 DIABETES MELLITUS WITH STAGE 3A CHRONIC KIDNEY DISEASE, WITHOUT LONG-TERM CURRENT USE OF INSULIN (HCC): ICD-10-CM

## 2025-05-05 NOTE — TELEPHONE ENCOUNTER
Patient called rx refill line inquiring a refill on Lancets. Request pending for review. Verbalized having a supply to get through 72 hours.

## 2025-05-06 RX ORDER — LANCETS 30 GAUGE
EACH MISCELLANEOUS
Qty: 200 EACH | Refills: 0 | Status: SHIPPED | OUTPATIENT
Start: 2025-05-06

## 2025-05-15 ENCOUNTER — RA CDI HCC (OUTPATIENT)
Dept: OTHER | Facility: HOSPITAL | Age: 80
End: 2025-05-15

## 2025-05-19 ENCOUNTER — OFFICE VISIT (OUTPATIENT)
Dept: FAMILY MEDICINE CLINIC | Facility: CLINIC | Age: 80
End: 2025-05-19
Payer: MEDICARE

## 2025-05-19 VITALS
WEIGHT: 170.2 LBS | OXYGEN SATURATION: 98 % | HEART RATE: 64 BPM | RESPIRATION RATE: 16 BRPM | SYSTOLIC BLOOD PRESSURE: 128 MMHG | DIASTOLIC BLOOD PRESSURE: 62 MMHG | BODY MASS INDEX: 25.79 KG/M2 | HEIGHT: 68 IN | TEMPERATURE: 97.5 F

## 2025-05-19 DIAGNOSIS — N18.31 TYPE 2 DIABETES MELLITUS WITH STAGE 3A CHRONIC KIDNEY DISEASE, WITHOUT LONG-TERM CURRENT USE OF INSULIN (HCC): Primary | ICD-10-CM

## 2025-05-19 DIAGNOSIS — N18.4 STAGE 4 CHRONIC KIDNEY DISEASE (HCC): ICD-10-CM

## 2025-05-19 DIAGNOSIS — I10 ESSENTIAL HYPERTENSION: ICD-10-CM

## 2025-05-19 DIAGNOSIS — E11.22 TYPE 2 DIABETES MELLITUS WITH STAGE 3A CHRONIC KIDNEY DISEASE, WITHOUT LONG-TERM CURRENT USE OF INSULIN (HCC): Primary | ICD-10-CM

## 2025-05-19 DIAGNOSIS — E78.2 MIXED HYPERLIPIDEMIA: ICD-10-CM

## 2025-05-19 LAB — SL AMB POCT HEMOGLOBIN AIC: 7.8 (ref ?–6.5)

## 2025-05-19 PROCEDURE — 83036 HEMOGLOBIN GLYCOSYLATED A1C: CPT | Performed by: FAMILY MEDICINE

## 2025-05-19 PROCEDURE — G2211 COMPLEX E/M VISIT ADD ON: HCPCS | Performed by: FAMILY MEDICINE

## 2025-05-19 PROCEDURE — 99214 OFFICE O/P EST MOD 30 MIN: CPT | Performed by: FAMILY MEDICINE

## 2025-05-19 RX ORDER — FEXOFENADINE HCL 180 MG/1
180 TABLET ORAL
COMMUNITY

## 2025-05-19 NOTE — ASSESSMENT & PLAN NOTE
"Central supply contacted at this time for follow up on central line kit delivery to ED. Central line  said "They are coming now".  " Lab Results   Component Value Date    EGFR 27 (L) 04/24/2025    EGFR 36 (L) 01/21/2025    EGFR 40 (L) 10/17/2024    CREATININE 2.38 (H) 04/24/2025    CREATININE 1.88 (H) 01/21/2025    CREATININE 1.71 (H) 10/17/2024

## 2025-05-19 NOTE — ASSESSMENT & PLAN NOTE
Lab Results   Component Value Date    HGBA1C 7.8 (A) 05/19/2025       Orders:  •  POCT hemoglobin A1c  •  semaglutide, 0.25 or 0.5 mg/dose, (Ozempic, 0.25 or 0.5 MG/DOSE,) 2 mg/3 mL injection pen; Inject 0.375 mL (0.25 mg total) under the skin once a week

## 2025-05-19 NOTE — PATIENT INSTRUCTIONS
A1c 7.8.  GFR dropped to 27 although he could have been relatively dry and probably is still in the stage III chronic kidney disease category.  Ozempic indicated for both chronic kidney disease and diabetes.  Restart of 0.25 mg once weekly for 4 weeks, then increase to 0.5 mg weekly.  Then send a message for prescription for the 1 mg dose if the 0.5 is tolerated okay.  Other medications stay the same.  Recheck in 3 months.

## 2025-05-19 NOTE — PROGRESS NOTES
Name: Kamari Spence      : 1945      MRN: [id number]  Encounter Provider: Kei Morgan MD  Encounter Date: 2025   Encounter department: [place] [facility]    Assessment & Plan  Type 2 diabetes mellitus with stage 3a chronic kidney disease, without long-term current use of insulin ([facility])    Lab Results   Component Value Date    HGBA1C 7.8 (A) 2025       Orders:  •  POCT hemoglobin A1c  •  semaglutide, 0.25 or 0.5 mg/dose, (Ozempic, 0.25 or 0.5 MG/DOSE,) 2 mg/3 mL injection pen; Inject 0.375 mL (0.25 mg total) under the skin once a week    Mixed hyperlipidemia         Stage 4 chronic kidney disease ([facility])  Lab Results   Component Value Date    EGFR 27 (L) 2025    EGFR 36 (L) 2025    EGFR 40 (L) 10/17/2024    CREATININE 2.38 (H) 2025    CREATININE 1.88 (H) 2025    CREATININE 1.71 (H) 10/17/2024            Essential hypertension            Patient Instructions   A1c 7.8.  GFR dropped to 27 although he could have been relatively dry and probably is still in the stage III chronic kidney disease category.  Ozempic indicated for both chronic kidney disease and diabetes.  Restart of 0.25 mg once weekly for 4 weeks, then increase to 0.5 mg weekly.  Then send a message for prescription for the 1 mg dose if the 0.5 is tolerated okay.  Other medications stay the same.  Recheck in 3 months.      History of Present Illness     HPI  Here for follow-up of diabetes, chronic kidney disease, hypertension and PMR.   Feels his blood pressure is doing okay.  But his sugars seem to be all over.  3 months ago, A1c was 7.9.  He opted not to make any changes.  Recently seen by Dr. Mansfield who suggested restarting a GLP-1.  Last GFR 27.  But BUN was increased to 54, previously 46 and 39.    Review of Systems    Past Medical History:   Diagnosis Date   • CKD (chronic kidney disease)    • Diabetes type 2, controlled ([facility])    • Hypertension    • Kidney stone     did have this  "removed   • PMR (polymyalgia rheumatica) ([facility]) 7/14/2023    Diagnosed 6/23 with elevated sed rate and CRP   • Rheumatoid arthritis involving multiple sites ([facility]) 7/14/2023    Positive rheumatoid factor 40   • Rheumatoid factor positive 7/14/2023     History reviewed. No pertinent surgical history.  Social History     Social History Narrative     since 1968.    2 children.  7 grandchildren.  3 live nearby, 4 live in [place].    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    Enjoys walking, visiting friends and grandkids. 25-30 miles a week.      Medications[1]  No Known Allergies  Immunization History   Administered Date(s) Administered   • COVID-19 MODERNA VACC 0.5 ML IM 03/19/2021, 04/16/2021, 12/13/2021, 04/22/2022, 04/17/2023   • COVID-19 Moderna Vac BIVALENT 12 Yr+ IM 0.5 ML 04/17/2023   • COVID-19 Pfizer mRNA vacc PF bird-sucrose 12 yr and older (Comirnaty) 11/11/2024   • INFLUENZA 11/23/2010, 12/08/2011, 12/13/2012, 11/27/2013, 10/02/2014, 12/21/2016, 10/05/2017, 11/21/2018, 12/04/2019, 10/21/2020   • Influenza Split High Dose Preservative Free IM 01/06/2016, 12/21/2016, 10/05/2017, 11/21/2018, 12/04/2019, 11/11/2024   • Influenza, Seasonal Vaccine, Quadrivalent, Adjuvanted, .5e 10/21/2020   • Influenza, seasonal, injectable 10/20/2022   • Pneumococcal Conjugate 13-Valent 09/02/2015   • Pneumococcal Polysaccharide PPV23 11/27/2009, 03/12/2018   • Zoster 10/08/2012     Objective   /62 (BP Location: Left arm, Patient Position: Sitting, Cuff Size: Large)   Pulse 64   Temp 97.5 °F (36.4 °C) (Tympanic)   Resp 16   Ht 5' 8\" (1.727 m)   Wt 77.2 kg (170 lb 3.2 oz)   SpO2 98%   BMI 25.88 kg/m²     Physical Exam  Appears well.  Lungs are clear.  Heart regular.  No edema.  Mood okay.  A1c 7.8.               [1]  Current Outpatient Medications on File Prior to Visit   Medication Sig   • Empagliflozin 25 MG TABS Take 1 tablet (25 mg total) by mouth daily   • fexofenadine " (ALLEGRA) 180 MG tablet Take 180 mg by mouth   • glipiZIDE (GLUCOTROL XL) 10 mg 24 hr tablet TAKE 2 TABLETS DAILY IN THE MORNING   • glucose blood test strip Test dasily   • hydroCHLOROthiazide 12.5 mg tablet Take 1 tablet (12.5 mg total) by mouth daily   • Lancets (OneTouch Delica Plus [id number]) MISC USE 1 NEW LANCET FOUR TIMES DAILY.   • metFORMIN (GLUCOPHAGE-XR) 500 mg 24 hr tablet Take 2 tablets (1,000 mg total) by mouth daily with breakfast   • NON FORMULARY Take 750 mg by mouth in the morning CURCUMIN   • olmesartan (BENICAR) 40 mg tablet Take 1 tablet (40 mg total) by mouth daily   • simvastatin (ZOCOR) 20 mg tablet Take 1 tablet (20 mg total) by mouth in the morning   • tamsulosin (FLOMAX) 0.4 mg Take 1 capsule (0.4 mg total) by mouth daily with dinner

## 2025-05-21 ENCOUNTER — OFFICE VISIT (OUTPATIENT)
Dept: FAMILY MEDICINE CLINIC | Facility: CLINIC | Age: 80
End: 2025-05-21
Payer: MEDICARE

## 2025-05-21 VITALS
TEMPERATURE: 96.4 F | BODY MASS INDEX: 25.76 KG/M2 | SYSTOLIC BLOOD PRESSURE: 110 MMHG | OXYGEN SATURATION: 99 % | DIASTOLIC BLOOD PRESSURE: 58 MMHG | HEIGHT: 68 IN | WEIGHT: 170 LBS | HEART RATE: 57 BPM

## 2025-05-21 DIAGNOSIS — R05.1 ACUTE COUGH: Primary | ICD-10-CM

## 2025-05-21 PROCEDURE — G2211 COMPLEX E/M VISIT ADD ON: HCPCS | Performed by: FAMILY MEDICINE

## 2025-05-21 PROCEDURE — 99213 OFFICE O/P EST LOW 20 MIN: CPT | Performed by: FAMILY MEDICINE

## 2025-05-21 NOTE — PROGRESS NOTES
"Name: Kamari Spence      : 1945      MRN: [id number]  Encounter Provider: Dunia Mcnair MD  Encounter Date: 2025   Encounter department: [place] [facility]  :  Assessment & Plan  Acute cough  Likely related to postnasal drip in the setting of allergic rhinitis.  Discussed using antihistamine and Flonase nasal spray consistently in addition to Netti pot and also use of a humidifier.  He will call should symptoms fail to resolve or worsen              History of Present Illness   Cough  This is a new problem. The current episode started in the past 7 days. The problem has been unchanged. The cough is Non-productive. Associated symptoms include nasal congestion and postnasal drip. Pertinent negatives include no ear congestion, ear pain, fever, rhinorrhea, shortness of breath or wheezing. Nothing aggravates the symptoms. His past medical history is significant for environmental allergies.     Patient states that he has been using Allegra and was also using Flonase nasal spray but not consistently.  There are no sick contacts at home.    Review of Systems   Constitutional:  Negative for fever.   HENT:  Positive for postnasal drip. Negative for ear pain and rhinorrhea.    Respiratory:  Positive for cough. Negative for shortness of breath and wheezing.    Allergic/Immunologic: Positive for environmental allergies.       Objective   /58 (BP Location: Left arm, Patient Position: Sitting, Cuff Size: Adult)   Pulse 57   Temp (!) 96.4 °F (35.8 °C) (Temporal)   Ht 5' 8\" (1.727 m)   Wt 77.1 kg (170 lb)   SpO2 99%   BMI 25.85 kg/m²      Physical Exam  Constitutional:       General: He is not in acute distress.     Appearance: He is not ill-appearing.   HENT:      Head: Normocephalic and atraumatic.     Eyes:      General:         Right eye: No discharge.         Left eye: No discharge.      Extraocular Movements: Extraocular movements intact.       Cardiovascular:      Rate and Rhythm: Normal " rate.   Pulmonary:      Effort: Pulmonary effort is normal. No respiratory distress.      Breath sounds: No wheezing or rales.     Neurological:      General: No focal deficit present.      Mental Status: He is alert.     Psychiatric:         Mood and Affect: Mood normal.         Behavior: Behavior normal.

## 2025-05-23 ENCOUNTER — OFFICE VISIT (OUTPATIENT)
Dept: FAMILY MEDICINE CLINIC | Facility: CLINIC | Age: 80
End: 2025-05-23

## 2025-05-23 VITALS
TEMPERATURE: 97.2 F | SYSTOLIC BLOOD PRESSURE: 120 MMHG | WEIGHT: 170 LBS | HEIGHT: 68 IN | DIASTOLIC BLOOD PRESSURE: 60 MMHG | BODY MASS INDEX: 25.76 KG/M2 | HEART RATE: 85 BPM | OXYGEN SATURATION: 99 %

## 2025-05-23 DIAGNOSIS — J01.00 ACUTE NON-RECURRENT MAXILLARY SINUSITIS: Primary | ICD-10-CM

## 2025-05-23 RX ORDER — AMOXICILLIN 875 MG/1
875 TABLET, COATED ORAL 2 TIMES DAILY
Qty: 14 TABLET | Refills: 0 | Status: SHIPPED | OUTPATIENT
Start: 2025-05-23 | End: 2025-05-30

## 2025-05-24 NOTE — PROGRESS NOTES
"Name: Kamari Spence      : 1945      MRN: [id number]  Encounter Provider: Dunia Mcnair MD  Encounter Date: 2025   Encounter department: [place] [facility]  :  Assessment & Plan  Acute non-recurrent maxillary sinusitis  Advised to continue with antihistamines, Flonase and Netti pot.  Prescription for amoxicillin provided should symptoms fail to resolve or worsen.  Follow-up as needed  Orders:    amoxicillin (AMOXIL) 875 mg tablet; Take 1 tablet (875 mg total) by mouth 2 (two) times a day for 7 days           History of Present Illness     Kamari Spence is a very pleasant 79-year-old male who presents today for follow-up.  He was seen 2 days ago with a chief complaint of cough and postnasal drip in the setting of allergies.  He was advised at that time to continue with antihistamines, add Flonase as also use a Netti pot.  He reports that he has been compliant with this however has started to develop a lot of sinus pain and pressure in addition to his other symptoms which prompted him to come in today for a follow-up visit.    Review of Systems   HENT:  Positive for congestion, postnasal drip, sinus pressure and sinus pain.    Respiratory:  Positive for cough.    Allergic/Immunologic: Positive for environmental allergies.       Objective   /60 (BP Location: Left arm, Patient Position: Sitting, Cuff Size: Large)   Pulse 85   Temp (!) 97.2 °F (36.2 °C) (Temporal)   Ht 5' 8\" (1.727 m)   Wt 77.1 kg (170 lb)   SpO2 99%   BMI 25.85 kg/m²      Physical Exam  Constitutional:       General: He is not in acute distress.     Appearance: He is not ill-appearing.   HENT:      Head: Normocephalic and atraumatic.      Right Ear: Tympanic membrane normal.      Left Ear: Tympanic membrane normal.      Nose:      Right Sinus: Maxillary sinus tenderness present.      Left Sinus: Maxillary sinus tenderness present.     Eyes:      General:         Right eye: No discharge.         Left eye: No " discharge.      Extraocular Movements: Extraocular movements intact.       Cardiovascular:      Rate and Rhythm: Normal rate.   Pulmonary:      Effort: Pulmonary effort is normal. No respiratory distress.      Breath sounds: No wheezing.     Neurological:      General: No focal deficit present.      Mental Status: He is alert.     Psychiatric:         Mood and Affect: Mood normal.         Behavior: Behavior normal.

## 2025-05-27 ENCOUNTER — OFFICE VISIT (OUTPATIENT)
Dept: FAMILY MEDICINE CLINIC | Facility: CLINIC | Age: 80
End: 2025-05-27
Payer: MEDICARE

## 2025-05-27 VITALS
BODY MASS INDEX: 26.13 KG/M2 | TEMPERATURE: 97.8 F | HEART RATE: 69 BPM | DIASTOLIC BLOOD PRESSURE: 60 MMHG | OXYGEN SATURATION: 99 % | WEIGHT: 172.38 LBS | RESPIRATION RATE: 17 BRPM | HEIGHT: 68 IN | SYSTOLIC BLOOD PRESSURE: 128 MMHG

## 2025-05-27 DIAGNOSIS — J01.00 ACUTE NON-RECURRENT MAXILLARY SINUSITIS: Primary | ICD-10-CM

## 2025-05-27 PROCEDURE — G2211 COMPLEX E/M VISIT ADD ON: HCPCS | Performed by: FAMILY MEDICINE

## 2025-05-27 PROCEDURE — 99213 OFFICE O/P EST LOW 20 MIN: CPT | Performed by: FAMILY MEDICINE

## 2025-05-27 NOTE — PROGRESS NOTES
Name: Kamari Spence      : 1945      MRN: [id number]  Encounter Provider: Kei Morgan MD  Encounter Date: 2025   Encounter department: [facility]    Assessment & Plan  Acute non-recurrent maxillary sinusitis            Patient Instructions   Reassured that he is getting better and is okay to attend grandchild's graduation this coming weekend.      History of Present Illness     HPI  1 week ago, 1 day after our last visit, started with nasal congestion, postnasal drip and a sore throat.  Saw Dr. Mcnair twice.  The second visit, placed on amoxicillin.    Had some discomfort in his ribs from coughing.  This morning, feels that his chest was a bit rattling.  3 times, coughed up some phlegm.  COVID test was negative.  No fever.  Has a lot of plans next weekend.  Couple grandkids graduating.  Last night was his best night.    Review of Systems    Past Medical History[1]  Past Surgical History[2]  Social History     Social History Narrative     since .    2 children.  7 grandchildren.  3 live nearby, 4 live in [place].    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    Enjoys walking, visiting friends and grandkids. 25-30 miles a week.      Medications[3]  No Known Allergies  Immunization History   Administered Date(s) Administered   • COVID-19 MODERNA VACC 0.5 ML IM 2021, 2021, 2021, 2022, 2023   • COVID-19 Moderna Vac BIVALENT 12 Yr+ IM 0.5 ML 2023   • COVID-19 Pfizer mRNA vacc PF bird-sucrose 12 yr and older (Comirnaty) 2024   • INFLUENZA 2010, 2011, 2012, 2013, 10/02/2014, 2016, 10/05/2017, 2018, 2019, 10/21/2020   • Influenza Split High Dose Preservative Free IM 2016, 2016, 10/05/2017, 2018, 2019, 2024   • Influenza, Seasonal Vaccine, Quadrivalent, Adjuvanted, .5e 10/21/2020   • Influenza, seasonal, injectable 10/20/2022   •  "Pneumococcal Conjugate 13-Valent 09/02/2015   • Pneumococcal Polysaccharide PPV23 11/27/2009, 03/12/2018   • Zoster 10/08/2012     Objective   /60 (Patient Position: Sitting, Cuff Size: Large)   Pulse 69   Temp 97.8 °F (36.6 °C) (Temporal)   Resp 17   Ht 5' 8\" (1.727 m)   Wt 78.2 kg (172 lb 6 oz)   SpO2 99%   BMI 26.21 kg/m²     Physical Exam  Appears well.  Left TM white.  Right blocked by wax.  Throat reveals no erythema.  No maxillary sinus tenderness.  Lungs are clear.  No wheezing or rhonchi.  Heart regular.           [1]  Past Medical History:  Diagnosis Date   • CKD (chronic kidney disease) 2021   • Diabetes type 2, controlled ([facility]) 2002   • Hypertension 1984   • Kidney stone 1980    did have this removed   • PMR (polymyalgia rheumatica) ([facility]) 7/14/2023    Diagnosed 6/23 with elevated sed rate and CRP   • Rheumatoid arthritis involving multiple sites ([facility]) 7/14/2023    Positive rheumatoid factor 40   • Rheumatoid factor positive 7/14/2023   [2]  No past surgical history on file.[3]  Current Outpatient Medications on File Prior to Visit   Medication Sig   • amoxicillin (AMOXIL) 875 mg tablet Take 1 tablet (875 mg total) by mouth 2 (two) times a day for 7 days   • Empagliflozin 25 MG TABS Take 1 tablet (25 mg total) by mouth daily   • fexofenadine (ALLEGRA) 180 MG tablet Take 180 mg by mouth   • glipiZIDE (GLUCOTROL XL) 10 mg 24 hr tablet TAKE 2 TABLETS DAILY IN THE MORNING   • glucose blood test strip Test [facility]   • hydroCHLOROthiazide 12.5 mg tablet Take 1 tablet (12.5 mg total) by mouth daily   • Lancets (OneTouch Delica Plus [id number]) MISC USE 1 NEW LANCET FOUR TIMES DAILY.   • metFORMIN (GLUCOPHAGE-XR) 500 mg 24 hr tablet Take 2 tablets (1,000 mg total) by mouth daily with breakfast   • NON FORMULARY Take 750 mg by mouth in the morning CURCUMIN   • olmesartan (BENICAR) 40 mg tablet Take 1 tablet (40 mg total) by mouth daily   • semaglutide, 0.25 or 0.5 mg/dose, (Ozempic, 0.25 or 0.5 " MG/DOSE,) 2 mg/3 mL injection pen Inject 0.375 mL (0.25 mg total) under the skin once a week   • simvastatin (ZOCOR) 20 mg tablet Take 1 tablet (20 mg total) by mouth in the morning   • tamsulosin (FLOMAX) 0.4 mg Take 1 capsule (0.4 mg total) by mouth daily with dinner

## 2025-05-27 NOTE — PATIENT INSTRUCTIONS
Reassured that he is getting better and is okay to attend grandchild's graduation this coming weekend.

## 2025-05-30 DIAGNOSIS — I10 ESSENTIAL HYPERTENSION: ICD-10-CM

## 2025-06-01 RX ORDER — HYDROCHLOROTHIAZIDE 12.5 MG/1
12.5 TABLET ORAL DAILY
Qty: 90 TABLET | Refills: 1 | Status: SHIPPED | OUTPATIENT
Start: 2025-06-01

## 2025-06-30 DIAGNOSIS — E11.22 TYPE 2 DIABETES MELLITUS WITH STAGE 3A CHRONIC KIDNEY DISEASE, WITHOUT LONG-TERM CURRENT USE OF INSULIN (HCC): Primary | ICD-10-CM

## 2025-06-30 DIAGNOSIS — N18.31 TYPE 2 DIABETES MELLITUS WITH STAGE 3A CHRONIC KIDNEY DISEASE, WITHOUT LONG-TERM CURRENT USE OF INSULIN (HCC): Primary | ICD-10-CM
